# Patient Record
Sex: FEMALE | Race: BLACK OR AFRICAN AMERICAN | Employment: OTHER | URBAN - METROPOLITAN AREA
[De-identification: names, ages, dates, MRNs, and addresses within clinical notes are randomized per-mention and may not be internally consistent; named-entity substitution may affect disease eponyms.]

---

## 2017-01-30 ENCOUNTER — HOSPITAL ENCOUNTER (OUTPATIENT)
Dept: GENERAL RADIOLOGY | Age: 82
Discharge: HOME | End: 2017-01-30
Payer: MEDICARE

## 2017-01-30 DIAGNOSIS — R41.0 DISORIENTATION: ICD-10-CM

## 2017-01-30 PROCEDURE — 71020 XR CHEST PA LAT: CPT

## 2017-02-15 ENCOUNTER — HOSPITAL ENCOUNTER (OUTPATIENT)
Dept: CT IMAGING | Age: 82
Discharge: HOME | End: 2017-02-15
Attending: INTERNAL MEDICINE
Payer: MEDICARE

## 2017-02-15 DIAGNOSIS — R41.0 DISORIENTATION: ICD-10-CM

## 2017-02-15 PROCEDURE — 70450 CT HEAD/BRAIN W/O DYE: CPT

## 2017-03-01 ENCOUNTER — HOSPITAL ENCOUNTER (INPATIENT)
Age: 82
LOS: 5 days | Discharge: NURSING, REHAB OR LONG-TERM CARE | DRG: 640 | End: 2017-03-06
Attending: EMERGENCY MEDICINE | Admitting: HOSPITALIST
Payer: MEDICARE

## 2017-03-01 ENCOUNTER — APPOINTMENT (OUTPATIENT)
Dept: GENERAL RADIOLOGY | Age: 82
DRG: 640 | End: 2017-03-01
Attending: EMERGENCY MEDICINE
Payer: MEDICARE

## 2017-03-01 DIAGNOSIS — R53.81 DEBILITY: ICD-10-CM

## 2017-03-01 DIAGNOSIS — F41.9 ANXIETY: ICD-10-CM

## 2017-03-01 DIAGNOSIS — R53.1 WEAKNESS: Primary | ICD-10-CM

## 2017-03-01 DIAGNOSIS — F22 PARANOIA (HCC): ICD-10-CM

## 2017-03-01 DIAGNOSIS — Z71.89 ADVANCED CARE PLANNING/COUNSELING DISCUSSION: ICD-10-CM

## 2017-03-01 DIAGNOSIS — R53.1 GENERALIZED WEAKNESS: ICD-10-CM

## 2017-03-01 LAB
ALBUMIN SERPL BCP-MCNC: 3.8 G/DL (ref 3.5–5)
ALBUMIN/GLOB SERPL: 0.9 {RATIO} (ref 1.1–2.2)
ALP SERPL-CCNC: 84 U/L (ref 45–117)
ALT SERPL-CCNC: 33 U/L (ref 12–78)
ANION GAP BLD CALC-SCNC: 13 MMOL/L (ref 5–15)
APPEARANCE UR: CLEAR
AST SERPL W P-5'-P-CCNC: 28 U/L (ref 15–37)
BACTERIA URNS QL MICRO: NEGATIVE /HPF
BASOPHILS # BLD AUTO: 0 K/UL (ref 0–0.1)
BASOPHILS # BLD: 0 % (ref 0–1)
BILIRUB SERPL-MCNC: 0.8 MG/DL (ref 0.2–1)
BILIRUB UR QL: NEGATIVE
BUN SERPL-MCNC: 20 MG/DL (ref 6–20)
BUN/CREAT SERPL: 15 (ref 12–20)
CALCIUM SERPL-MCNC: 10.7 MG/DL (ref 8.5–10.1)
CHLORIDE SERPL-SCNC: 93 MMOL/L (ref 97–108)
CK SERPL-CCNC: 66 U/L (ref 26–192)
CO2 SERPL-SCNC: 25 MMOL/L (ref 21–32)
COLOR UR: ABNORMAL
CREAT SERPL-MCNC: 1.37 MG/DL (ref 0.55–1.02)
EOSINOPHIL # BLD: 0 K/UL (ref 0–0.4)
EOSINOPHIL NFR BLD: 0 % (ref 0–7)
EPITH CASTS URNS QL MICRO: ABNORMAL /LPF
ERYTHROCYTE [DISTWIDTH] IN BLOOD BY AUTOMATED COUNT: 14 % (ref 11.5–14.5)
GLOBULIN SER CALC-MCNC: 4.4 G/DL (ref 2–4)
GLUCOSE BLD STRIP.AUTO-MCNC: 74 MG/DL (ref 65–100)
GLUCOSE BLD STRIP.AUTO-MCNC: 96 MG/DL (ref 65–100)
GLUCOSE SERPL-MCNC: 82 MG/DL (ref 65–100)
GLUCOSE UR STRIP.AUTO-MCNC: NEGATIVE MG/DL
HCT VFR BLD AUTO: 36.7 % (ref 35–47)
HGB BLD-MCNC: 12.7 G/DL (ref 11.5–16)
HGB UR QL STRIP: NEGATIVE
HYALINE CASTS URNS QL MICRO: ABNORMAL /LPF (ref 0–5)
KETONES UR QL STRIP.AUTO: 40 MG/DL
LEUKOCYTE ESTERASE UR QL STRIP.AUTO: NEGATIVE
LYMPHOCYTES # BLD AUTO: 12 % (ref 12–49)
LYMPHOCYTES # BLD: 1.3 K/UL (ref 0.8–3.5)
MCH RBC QN AUTO: 30.5 PG (ref 26–34)
MCHC RBC AUTO-ENTMCNC: 34.6 G/DL (ref 30–36.5)
MCV RBC AUTO: 88.2 FL (ref 80–99)
MONOCYTES # BLD: 0.9 K/UL (ref 0–1)
MONOCYTES NFR BLD AUTO: 8 % (ref 5–13)
NEUTS SEG # BLD: 8.2 K/UL (ref 1.8–8)
NEUTS SEG NFR BLD AUTO: 80 % (ref 32–75)
NITRITE UR QL STRIP.AUTO: NEGATIVE
PH UR STRIP: 5.5 [PH] (ref 5–8)
PLATELET # BLD AUTO: 247 K/UL (ref 150–400)
POTASSIUM SERPL-SCNC: 4.1 MMOL/L (ref 3.5–5.1)
PROT SERPL-MCNC: 8.2 G/DL (ref 6.4–8.2)
PROT UR STRIP-MCNC: ABNORMAL MG/DL
RBC # BLD AUTO: 4.16 M/UL (ref 3.8–5.2)
RBC #/AREA URNS HPF: ABNORMAL /HPF (ref 0–5)
SERVICE CMNT-IMP: NORMAL
SERVICE CMNT-IMP: NORMAL
SODIUM SERPL-SCNC: 131 MMOL/L (ref 136–145)
SP GR UR REFRACTOMETRY: 1.01 (ref 1–1.03)
UROBILINOGEN UR QL STRIP.AUTO: 1 EU/DL (ref 0.2–1)
WBC # BLD AUTO: 10.4 K/UL (ref 3.6–11)
WBC URNS QL MICRO: ABNORMAL /HPF (ref 0–4)

## 2017-03-01 PROCEDURE — 83036 HEMOGLOBIN GLYCOSYLATED A1C: CPT | Performed by: INTERNAL MEDICINE

## 2017-03-01 PROCEDURE — 85025 COMPLETE CBC W/AUTO DIFF WBC: CPT | Performed by: STUDENT IN AN ORGANIZED HEALTH CARE EDUCATION/TRAINING PROGRAM

## 2017-03-01 PROCEDURE — 71020 XR CHEST PA LAT: CPT

## 2017-03-01 PROCEDURE — 82550 ASSAY OF CK (CPK): CPT | Performed by: EMERGENCY MEDICINE

## 2017-03-01 PROCEDURE — 82962 GLUCOSE BLOOD TEST: CPT

## 2017-03-01 PROCEDURE — 81001 URINALYSIS AUTO W/SCOPE: CPT | Performed by: STUDENT IN AN ORGANIZED HEALTH CARE EDUCATION/TRAINING PROGRAM

## 2017-03-01 PROCEDURE — 72052 X-RAY EXAM NECK SPINE 6/>VWS: CPT

## 2017-03-01 PROCEDURE — 51701 INSERT BLADDER CATHETER: CPT

## 2017-03-01 PROCEDURE — 77030011943

## 2017-03-01 PROCEDURE — 36415 COLL VENOUS BLD VENIPUNCTURE: CPT | Performed by: STUDENT IN AN ORGANIZED HEALTH CARE EDUCATION/TRAINING PROGRAM

## 2017-03-01 PROCEDURE — 93005 ELECTROCARDIOGRAM TRACING: CPT

## 2017-03-01 PROCEDURE — 74011250636 HC RX REV CODE- 250/636: Performed by: HOSPITALIST

## 2017-03-01 PROCEDURE — 65270000032 HC RM SEMIPRIVATE

## 2017-03-01 PROCEDURE — 80053 COMPREHEN METABOLIC PANEL: CPT | Performed by: STUDENT IN AN ORGANIZED HEALTH CARE EDUCATION/TRAINING PROGRAM

## 2017-03-01 PROCEDURE — 99285 EMERGENCY DEPT VISIT HI MDM: CPT

## 2017-03-01 RX ORDER — ESTRADIOL 10 UG/1
10 INSERT VAGINAL
COMMUNITY

## 2017-03-01 RX ORDER — COLCHICINE 0.6 MG/1
0.3 TABLET ORAL DAILY
Status: DISCONTINUED | OUTPATIENT
Start: 2017-03-02 | End: 2017-03-06 | Stop reason: HOSPADM

## 2017-03-01 RX ORDER — LOSARTAN POTASSIUM 50 MG/1
50 TABLET ORAL DAILY
Status: DISCONTINUED | OUTPATIENT
Start: 2017-03-02 | End: 2017-03-02

## 2017-03-01 RX ORDER — LATANOPROST 50 UG/ML
1 SOLUTION/ DROPS OPHTHALMIC
COMMUNITY

## 2017-03-01 RX ORDER — LOSARTAN POTASSIUM 50 MG/1
50 TABLET ORAL DAILY
COMMUNITY

## 2017-03-01 RX ORDER — ACETAMINOPHEN 325 MG/1
650 TABLET ORAL
Status: DISCONTINUED | OUTPATIENT
Start: 2017-03-01 | End: 2017-03-06 | Stop reason: HOSPADM

## 2017-03-01 RX ORDER — CALCITRIOL 0.25 UG/1
0.25 CAPSULE ORAL
Status: DISCONTINUED | OUTPATIENT
Start: 2017-03-03 | End: 2017-03-06 | Stop reason: HOSPADM

## 2017-03-01 RX ORDER — SODIUM CHLORIDE 0.9 % (FLUSH) 0.9 %
5-10 SYRINGE (ML) INJECTION AS NEEDED
Status: DISCONTINUED | OUTPATIENT
Start: 2017-03-01 | End: 2017-03-06 | Stop reason: HOSPADM

## 2017-03-01 RX ORDER — ENOXAPARIN SODIUM 100 MG/ML
30 INJECTION SUBCUTANEOUS EVERY 24 HOURS
Status: DISCONTINUED | OUTPATIENT
Start: 2017-03-01 | End: 2017-03-06 | Stop reason: HOSPADM

## 2017-03-01 RX ORDER — SODIUM CHLORIDE 0.9 % (FLUSH) 0.9 %
5-10 SYRINGE (ML) INJECTION EVERY 8 HOURS
Status: DISCONTINUED | OUTPATIENT
Start: 2017-03-01 | End: 2017-03-06 | Stop reason: HOSPADM

## 2017-03-01 RX ORDER — SODIUM CHLORIDE 9 MG/ML
100 INJECTION, SOLUTION INTRAVENOUS CONTINUOUS
Status: DISCONTINUED | OUTPATIENT
Start: 2017-03-01 | End: 2017-03-02

## 2017-03-01 RX ORDER — LATANOPROST 50 UG/ML
1 SOLUTION/ DROPS OPHTHALMIC
Status: DISCONTINUED | OUTPATIENT
Start: 2017-03-01 | End: 2017-03-06 | Stop reason: HOSPADM

## 2017-03-01 RX ORDER — COLCHICINE 0.6 MG/1
0.6 TABLET ORAL DAILY
COMMUNITY

## 2017-03-01 RX ORDER — CALCITRIOL 0.25 UG/1
0.25 CAPSULE ORAL
COMMUNITY

## 2017-03-01 RX ORDER — ONDANSETRON 2 MG/ML
4 INJECTION INTRAMUSCULAR; INTRAVENOUS
Status: DISCONTINUED | OUTPATIENT
Start: 2017-03-01 | End: 2017-03-06 | Stop reason: HOSPADM

## 2017-03-01 RX ADMIN — Medication 10 ML: at 21:37

## 2017-03-01 RX ADMIN — ENOXAPARIN SODIUM 30 MG: 30 INJECTION SUBCUTANEOUS at 18:34

## 2017-03-01 RX ADMIN — Medication 10 ML: at 18:35

## 2017-03-01 RX ADMIN — SODIUM CHLORIDE 100 ML/HR: 900 INJECTION, SOLUTION INTRAVENOUS at 18:35

## 2017-03-01 NOTE — PROGRESS NOTES
SSED/CM consult received and appreciated. EMR reviewed. Case discussed w/ CarineRN shared concern about patient's caregiver hospitalized at [facility]. CM received call from [facility] and informed patient's twin sister/caregiver Sonam Barney is hospitalized at [facility] - provided CM name/number of friend of twin sisters Mady Pandya [phone] (H)/ [phone] (M). Friend currently visiting and will see patient in the ER. CM arrived to patient's room as was leaving unit for test. Introduced self to patient and Marisol West. MsDianneZinaElizabeth provided brief history for twin sisters. Marisol West has known patient for 20 years and are members of [address] [place] [address]. Emile Schlatter loss her spouse 4 years ago and lives alone in [place] and her twin sister lives in [facility]. Confusion noted in patient 5 weeks ago of which Star Schaffer had drove her vehicle until then at that time sister started driving patient to MD appointments and bring meals. Patient does not have any children - other relative know is Stefani Llanos ([facility] who is Marielle's daughter. Miguel Angel Batista had a stroke a year ago - Marisol West has attempted to reach niece. When friend had noticed changes in patient a few weeks ago - expressed to sister the need to accompany her and Star Schaffer to MD appointments in case something happened to Betty she would know which physicians were following and locations - however current hospitalization occurred before planning could take place. Patient said recently \" someone is trying to poison me and not wanting to die in house alone. \"    Marisol West lives in [facility] and was concerned when she did not see twin sister at [facility] on Sunday 2/26/17 since Betty had become care giver for Star Schaffer. This writer asked friend if she would be able to become caregiver for patient   - Ms. Landis declines accepting caregiver role.       Care Management Interventions  Mode of Transport at Discharge: [address] [facility]  [address] Time of Discharge: 1252  Transition of Care Consult (CM Consult): Discharge Planning  MyChart Signup: No  Discharge Durable Medical Equipment: No  Physical Therapy Consult: No  Occupational Therapy Consult: No  Speech Therapy Consult: No  Current Support Network: Lives Alone  Confirm Follow Up Transport:  (TBD)  Plan discussed with Pt/Family/Caregiver: No  Discharge Location  Discharge Placement:  (TBD)      5342 Attempted to reach [facility] is full.

## 2017-03-01 NOTE — ED TRIAGE NOTES
Patient arrives via EMS from home with c/o not taking her medications or eating in the past 2-3 days. Patient has hx dementia is lives with her sister who is her primary caregiver. Patients sister was admitted into the hospital a couple days ago and patient was left home alone. Patients neighbor noticed they hadn't saw the patient or her sister in the past couple days so she called PD. Patient appeared disheveled and appeared to not have taken any medications or ate in a couple days. EMS BS 72. Patient denies any complaints at this time.

## 2017-03-01 NOTE — H&P
[address]   [address], [address]   HISTORY AND PHYSICAL       Name:  Harman Sun   MR#:  [id number]   :  1929   Account #:  [de-identified]        Date of Adm:  2017       ADMITTING ATTENDING: Greer Nicole MD    PRIMARY CARE PHYSICIAN: Please note, although the system says,   Dr. Maria Del Rosario Rahman, the patient claimed that her primary care physician   is Dr. Daryl Eagle. CHIEF COMPLAINT: None. HISTORY OF PRESENT ILLNESS: Please note that this whole history   is as per the ER physician as well as the ER nurse and the EMS note,   but I was not able to get a good history from the patient. This is an 80  year-old [facility] American female who has a past medical history of   dementia and hypertension, hypercalcemia. She comes over here   because she lives alone and usually her twin sister comes and takes   care of her every day and fixes meals for her as well and gives her her   own medications, but when the neighbor did not see the sister as well   as the patient for the last 3 days, then she called the EMS. It was   noted that the patient has not been eating since the last 3 days and   there were no signs of injury, although the patient initially claimed that   she has pain all over the body, but when I asked her, she did not have   any complaints whatsoever, except for not eaten anything since the   last 3 days. No chest pain, shortness of breath, headache, dizziness,   cough, fever, changes in bowel movements. No nausea or vomiting. REVIEW OF SYSTEMS: Ten review of systems were done; they were   all negative except for above. PAST MEDICAL HISTORY: I am not sure if this is complete, but the   patient has:   1. Dementia. 2. Hypercalcemia. 3. Hypertension. 4. History of gout. PAST SURGICAL HISTORY: I am not able to gather the patient's past   surgical history from the patient. SOCIAL HISTORY: Nonsmoker, nonalcoholic, nondrug abuser.     ALLERGIES: THE PATIENT IS ALLERGIC TO PENICILLIN. HOME MEDICATIONS   1. Calcitriol 0.25 mcg p.o. daily. 2. Colchicine 0.6 mg p.o. daily. 3. Losartan 50 mg p.o. daily. FAMILY HISTORY: Not able to get family history from the patient. PHYSICAL EXAMINATION   VITAL SIGNS: At the time that the patient was seen, the patient's vitals   were as follows: Blood pressure 179/74, pulse 77, respiratory rate 17,   saturating 99% on room air. GENERAL: Not in distress, comfortably lying in bed. HEENT: Head normocephalic, atraumatic. EYES: PERRLA, EOMI. EARS: Bilateral hearing normal. No growths. NOSE: No polyps, no bleeding. MOUTH: Dry mucous membranes. Decent oral hygiene. NECK: Supple, no JVD, no thyromegaly. RESPIRATORY: Clear to auscultation bilaterally. No adventitious   breath sounds. CARDIOVASCULAR: S1, S2 normal. No murmurs, rubs or gallops. GASTROINTESTINAL: Bowel sounds present. Soft, nontender,   nondistended. NEUROLOGICAL: Cranial nerves II through XII intact. Motor    bilateral upper limbs and lower limbs. Sensory normal.   PSYCHIATRIC: Mood and affect appropriate. Alert, awake, oriented   x1. LABORATORIES AND RADIOLOGICAL RESULTS: WBC 10.4,   hemoglobin 12.7, hematocrit 36 and a platelet count of 815. A   urinalysis did not show any signs of infection. Sodium 131, potassium   4.1, chloride 93, bicarbonate 25, glucose 82, BUN 20, creatinine 1.37. The patient's baseline creatinine seems to be close 1.1 to 1.2. CT scan   of the head was done recently which showed multifocal white matter   disease, no acute abnormality. X-ray of the chest was done today. It   was essentially normal. X-ray of the cervical spine showed no acute   abnormality, diffuse degenerative changes. ELECTROCARDIOGRAM: Shows sinus rhythm with frequent PVCs,   no ST-T wave changes suggestive of ischemia.     ASSESSMENT AND PLAN: This is an 71-year-old [facility] American   female with past medical history of hypertension, hypercalcemia,   dementia and gout, who comes over here because she has not been   able to take care of herself. 1. Failure to thrive. We will admit the patient and currently the patient   looks quite dehydrated. I would put the patient on IV fluids and would   also involve Physical Therapy and Case Management in taking care of   the patient. 2. Hypertension. We would restart her home medications and we will   monitor it closely. 3. Hypercalcemia. Currently, I am not sure what is the cause of the   patient's hypercalcemia. Probably tomorrow we would have to reach   the patient's primary care physician to find out the cause of it. We   would continue the patient's calcitriol for now. 4. For now, I will keep the patient FULL CODE and will consult   Palliative Care and finding out the patient's code status.         MD Colin Ya / Anni Miranda   D:  03/01/2017   16:33   T:  03/01/2017   17:14   Job #:  [id number]

## 2017-03-01 NOTE — ROUTINE PROCESS
TRANSFER - OUT REPORT:    Verbal report given to Mirna TEJADA(name) on Nadege Dukes  being transferred to [address] [place] [address](unit) for routine progression of care       Report consisted of patients Situation, Background, Assessment and   Recommendations(SBAR). Information from the following report(s) SBAR, ED Summary, Procedure Summary, MAR, Recent Results and Cardiac Rhythm NSR was reviewed with the receiving nurse. Lines:   Peripheral IV 03/01/17 Left Antecubital (Active)   Site Assessment Clean, dry, & intact 3/1/2017  1:20 PM   Phlebitis Assessment 0 3/1/2017  1:20 PM   Infiltration Assessment 0 3/1/2017  1:20 PM   Dressing Status Clean, dry, & intact 3/1/2017  1:20 PM   Dressing Type Transparent 3/1/2017  1:20 PM   Hub Color/Line Status Blue 3/1/2017  1:20 PM   Action Taken Blood drawn 3/1/2017  1:20 PM        Opportunity for questions and clarification was provided.       Patient transported with:   transport

## 2017-03-01 NOTE — ED PROVIDER NOTES
HPI Comments: 80 y.o. female withmedical history significant for dementia who presents from home for evaluation of health. Pt arrives via EMS after a neighbor became worried because she didn't see the pt's sister for several days who is her primary caretaker. The pt's sister was admitted to the hospital several days ago, and per EMS it appears that the pt hasn't been eating or taking her medications since her sister was admitted to the hospital. Pt states that she lives alone, and that her sister generally takes care of her. Pt claims that she walked with assistance to the ambulance when they came to get her. Pt claims she has pain all over, and specifically mentions her neck. Per EMS pt's blood glucose was 72. Pt is unsure if she's fallen. There are no other acute medical concerns at this time. PCP: Debbie Dukes MD    Note written by Yanira Maurice, as dictated by Ricky San MD 2:05 PM    Full history, physical exam, and ROS unable to be obtained due to:  dementia. The history is provided by the patient. The history is limited by the condition of the patient. No  was used. No past medical history on file. No past surgical history on file. No family history on file. Social History     Social History    Marital status:      Spouse name: N/A    Number of children: N/A    Years of education: N/A     Occupational History    Not on file.      Social History Main Topics    Smoking status: Not on file    Smokeless tobacco: Not on file    Alcohol use Not on file    Drug use: Not on file    Sexual activity: Not on file     Other Topics Concern    Not on file     Social History Narrative         ALLERGIES: Penicillins    Review of Systems   Unable to perform ROS: Dementia       Vitals:    03/01/17 1307   BP: 162/72   Pulse: 88   Resp: 16   Temp: 98.1 °F (36.7 °C)   SpO2: 97%   Weight: 47.6 kg (105 lb)   Height: 5' (1.524 m)            Physical Exam   Constitutional: She is oriented to person, place, and time. She appears well-developed and well-nourished. HENT:   Head: Normocephalic and atraumatic. Mouth/Throat: Oropharynx is clear and moist.   Eyes: EOM are normal. Pupils are equal, round, and reactive to light. Neck: Normal range of motion. Neck supple. Cardiovascular: Normal rate, regular rhythm, normal heart sounds and intact distal pulses. Exam reveals no gallop and no friction rub. No murmur heard. Pulmonary/Chest: Effort normal. No respiratory distress. She has no wheezes. She has no rales. Abdominal: Soft. There is no tenderness. There is no rebound. Musculoskeletal: Normal range of motion. She exhibits no tenderness. Can lfit both LE 1 inch above stretcher, Can lift both arms against gravity. Neurological: She is alert and oriented to person, place, and time. No cranial nerve deficit. Motor; symmetric   Skin: No erythema. Psychiatric: Her behavior is normal. Her mood appears anxious. She exhibits a depressed mood. Nursing note and vitals reviewed. Note written by Yanira Nelson, as dictated by Atif Flores MD 2:07 PM      [facility]  ED Course       Procedures    CONSULT NOTE:  3:20 PM Atif Flores MD spoke with Dr. Stevie Mckay, Consult for Hospitalist.  Discussed available diagnostic tests and clinical findings. He is in agreement with care plans as outlined. Dr. Stevie Mckay will admit.

## 2017-03-01 NOTE — PROGRESS NOTES
TRANSFER - IN REPORT:    Verbal report received from [facility]) on Lalo Bertrand  being received from ED(unit) for routine progression of care      Report consisted of patients Situation, Background, Assessment and   Recommendations(SBAR). Information from the following report(s) SBAR, ED Summary, MAR, Recent Results and Med Rec Status was reviewed with the receiving nurse. Opportunity for questions and clarification was provided. Assessment completed upon patients arrival to unit and care assumed.

## 2017-03-01 NOTE — ED NOTES
Bedside and Verbal shift change report given to Skipper Paget, RN (oncoming nurse) by Lindle Sandifer, RN (offgoing nurse). Report given with SBAR, Kardex, Intake/Output, MAR and Recent Results.

## 2017-03-01 NOTE — IP AVS SNAPSHOT
[address] [place] [address] 
[phone] Patient: Darius Walsh MRN: [id number] :1929 You are allergic to the following Allergen Reactions Penicillins Unknown (comments) Hx dementia Recent Documentation Height Weight Breastfeeding? BMI  
  
 1.524 m 47.6 kg No 20.51 kg/m2 Emergency Contacts  (Rel.) Home Phone Work Phone Mobile Phone [address] (Other Relative) [phone] -- [phone] About your hospitalization You were admitted on:  2017 You last received care in the:  [facility] You were discharged on:  2017 Why you were hospitalized Your primary diagnosis was:  Not on File Your diagnoses also included:  Generalized Weakness, Debility, Paranoia (Hcc), Anxiety, Advanced Care Planning/Counseling Discussion Providers Seen During Your Hospitalizations Provider Role Specialty Primary office phone Felipe Henriquez MD Attending Provider Emergency Medicine [phone] Nino Beebe MD Attending Provider Internal Medicine [phone] Rolf Machuca MD Attending Provider Internal Medicine [phone] Cathi Tavarez MD Attending Provider Internal Medicine [phone] Jacqueline Zapien MD Attending Provider Internal Medicine [phone] Your Primary Care Physician (PCP) Primary Care Physician Office Phone Office Fax Dario Bolivar [phone] Follow-up Information Follow up With Details Comments Contact Info Sahra Sweet MD In 2 weeks UTI, in hospital, f/u UA, other lab as felt needed. [address] [place] [address] Cardiology [address] [place] [address] 
[phone] Current Discharge Medication List  
  
START taking these medications Dose & Instructions Dispensing Information Comments Morning Noon Evening Bedtime cefUROXime 250 mg tablet Commonly known as:  CEFTIN Your next dose is: Today, Tomorrow Other:  _________ Dose:  250 mg Take 1 Tab by mouth two (2) times a day. Quantity:  4 Tab Refills:  0 CONTINUE these medications which have NOT CHANGED Dose & Instructions Dispensing Information Comments Morning Noon Evening Bedtime  
 calcitRIOL 0.25 mcg capsule Commonly known as:  ROCALTROL Your next dose is: Today, Tomorrow Other:  _________ Dose:  0.25 mcg Take 0.25 mcg by mouth. Take 1 capsule by mouth every Tuesday, Wednesday, Friday, Saturday, Sunday Refills:  0  
     
   
   
   
  
 colchicine 0.6 mg tablet Your next dose is: Today, Tomorrow Other:  _________ Dose:  0.6 mg Take 0.6 mg by mouth daily. Refills:  0  
     
   
   
   
  
 latanoprost 0.005 % ophthalmic solution Commonly known as:  Pro Jumbo Your next dose is: Today, Tomorrow Other:  _________ Dose:  1 Drop Administer 1 Drop to both eyes nightly. Refills:  0  
     
   
   
   
  
 losartan 50 mg tablet Commonly known as:  COZAAR Your next dose is: Today, Tomorrow Other:  _________ Dose:  50 mg Take 50 mg by mouth daily. Refills:  0 YUVAFEM 10 mcg Tab vaginal tablet Generic drug:  estradiol Your next dose is: Today, Tomorrow Other:  _________ Dose:  10 mcg Insert 10 mcg into vagina nightly. Refills:  0 Where to Get Your Medications Information on where to get these meds will be given to you by the nurse or doctor. ! Ask your nurse or doctor about these medications  
  cefUROXime 250 mg tablet Discharge Instructions Discharge Instructions PATIENT ID: Dustin Rios MRN: [id number] YOB: 1929 DATE OF ADMISSION: 3/1/2017 12:53 PM   
DATE OF DISCHARGE: 3/6/2017 PRIMARY CARE PROVIDER: Michaelle Bauer MD  
 
ATTENDING PHYSICIAN: Naila Asif MD 
DISCHARGING PROVIDER: Naila Asif MD   
To contact this individual call [phone] and ask the  to page. If unavailable ask to be transferred the Adult Hospitalist Department. DISCHARGE DIAGNOSES see dc note CONSULTATIONS: ED CONSULT TO SENIOR SERVICES CASE MANAGEMENT 
IP CONSULT TO PALLIATIVE CARE - PROVIDER 
IP CONSULT TO HOSPITALIST 
 
PROCEDURES/SURGERIES: * No surgery found * PENDING TEST RESULTS:  
At the time of discharge the following test results are still pending: na 
 
FOLLOW UP APPOINTMENTS:  
Follow-up Information Follow up With Details Comments Contact Info Michaelle Bauer MD In 2 weeks UTI, in hospital, f/u UA, other lab as felt needed. [address] [place] [address] Cardiology [address] [place] [address] 
[phone] ADDITIONAL CARE RECOMMENDATIONS: na 
 
DIET: regular low lactose diet Oral Nutritional Supplements: Ensure Complete or Ensure Plus Three times daily ACTIVITY: Activity as tolerated WOUND CARE: na 
  
EQUIPMENT needed: na 
 
 
 
 
DISPOSITION: 
  Home With: 
 OT  PT  Troy Peres RN  
  
 x SNF/Inpatient Rehab/LTAC Independent/assisted living Hospice Other: CDMP Checked:  
  
 
Signed:  
Deon Cheng MD 
3/6/2017 
2:59 PM 
 
 
Discharge Orders None Tavern Announcement We are excited to announce that we are making your provider's discharge notes available to you in Tavern. You will see these notes when they are completed and signed by the physician that discharged you from your recent hospital stay. If you have any questions or concerns about any information you see in Tavern, please call the Health Information Department where you were seen or reach out to your Primary Care Provider for more information about your plan of care. Introducing hospitals & HEALTH SERVICES! Dinah Garcia introduces Tavern patient portal. Now you can access parts of your medical record, email your doctor's office, and request medication refills online. 1. In your internet browser, go to [email or web address] Mobile 2. Click on the First Time User? Click Here link in the Sign In box. You will see the New Member Sign Up page. 3. Enter your Tavern Access Code exactly as it appears below. You will not need to use this code after youve completed the sign-up process. If you do not sign up before the expiration date, you must request a new code. · Tavern Access Code: [email or web address] Expires: 4/30/2017  1:24 PM 
 
4. Enter the last four digits of your Social Security Number (xxxx) and Date of Birth (mm/dd/yyyy) as indicated and click Submit. You will be taken to the next sign-up page. 5. Create a eflowt ID. This will be your Tavern login ID and cannot be changed, so think of one that is secure and easy to remember. 6. Create a Tavern password. You can change your password at any time. 7. Enter your Password Reset Question and Answer. This can be used at a later time if you forget your password. 8. Enter your e-mail address.  You will receive e-mail notification when new information is available in IPLogict. 9. Click Sign Up. You can now view and download portions of your medical record. 10. Click the Download Summary menu link to download a portable copy of your medical information. If you have questions, please visit the Frequently Asked Questions section of the [employer] website. Remember, [facility] is NOT to be used for urgent needs. For medical emergencies, dial 911. Now available from your iPhone and Android! General Information Please provide this summary of care documentation to your next provider. Patient Signature:  ____________________________________________________________ Date:  ____________________________________________________________  
  
Ainsley Norris Provider Signature:  ____________________________________________________________ Date:  ____________________________________________________________

## 2017-03-01 NOTE — PROGRESS NOTES
Admission Medication Reconciliation:    Information obtained from: Rx Query / Patient    Chief Complaint for this Admission: Not taking her medications/ eating food    Allergies:  Penicillins    Comments/Recommendations: Discussed PTA medications with patient. 1) Added Calcitriol, Colchine, Yuvafem, Latanoprost, and Losartan  2) Patient does not seem to be a good historian and is unsure about why she is taking her medications. She also complained that she does not know if she is on the right regimen for her condition. 3) [facility] confirmed all active medications along with dosing    Prior to Admission Medications:   Prior to Admission Medications   Prescriptions Last Dose Informant Patient Reported? Taking?   calcitRIOL (ROCALTROL) 0.25 mcg capsule 2/28/2017  Yes Yes   Sig: Take 0.25 mcg by mouth. Take 1 capsule by mouth every Tuesday, Wednesday, Friday, Saturday, Sunday   colchicine 0.6 mg tablet 2/28/2017  Yes Yes   Sig: Take 0.6 mg by mouth daily. estradiol (YUVAFEM) 10 mcg tab vaginal tablet 2/28/2017  Yes Yes   Sig: Insert 10 mcg into vagina nightly. latanoprost (XALATAN) 0.005 % ophthalmic solution 2/28/2017  Yes Yes   Sig: Administer 1 Drop to both eyes nightly. losartan (COZAAR) 50 mg tablet 2/28/2017  Yes Yes   Sig: Take 50 mg by mouth daily.       Facility-Administered Medications: None     Dexter Villeda  PharmD Candidate 2017  KERRI Farrell

## 2017-03-02 PROBLEM — F41.9 ANXIETY: Status: ACTIVE | Noted: 2017-03-02

## 2017-03-02 PROBLEM — F22 PARANOIA (HCC): Status: ACTIVE | Noted: 2017-03-02

## 2017-03-02 PROBLEM — Z71.89 ADVANCED CARE PLANNING/COUNSELING DISCUSSION: Status: ACTIVE | Noted: 2017-03-02

## 2017-03-02 PROBLEM — R53.81 DEBILITY: Status: ACTIVE | Noted: 2017-03-02

## 2017-03-02 LAB
ALBUMIN SERPL BCP-MCNC: 3 G/DL (ref 3.5–5)
ALBUMIN/GLOB SERPL: 0.8 {RATIO} (ref 1.1–2.2)
ALP SERPL-CCNC: 75 U/L (ref 45–117)
ALT SERPL-CCNC: 26 U/L (ref 12–78)
ANION GAP BLD CALC-SCNC: 10 MMOL/L (ref 5–15)
AST SERPL W P-5'-P-CCNC: 22 U/L (ref 15–37)
ATRIAL RATE: 96 BPM
BILIRUB SERPL-MCNC: 0.8 MG/DL (ref 0.2–1)
BUN SERPL-MCNC: 16 MG/DL (ref 6–20)
BUN/CREAT SERPL: 14 (ref 12–20)
CALCIUM SERPL-MCNC: 9.4 MG/DL (ref 8.5–10.1)
CALCULATED P AXIS, ECG09: 72 DEGREES
CALCULATED R AXIS, ECG10: 63 DEGREES
CALCULATED T AXIS, ECG11: 56 DEGREES
CHLORIDE SERPL-SCNC: 97 MMOL/L (ref 97–108)
CO2 SERPL-SCNC: 26 MMOL/L (ref 21–32)
CREAT SERPL-MCNC: 1.16 MG/DL (ref 0.55–1.02)
DIAGNOSIS, 93000: NORMAL
EST. AVERAGE GLUCOSE BLD GHB EST-MCNC: 103 MG/DL
FOLATE SERPL-MCNC: 14.4 NG/ML (ref 5–21)
GLOBULIN SER CALC-MCNC: 3.6 G/DL (ref 2–4)
GLUCOSE SERPL-MCNC: 132 MG/DL (ref 65–100)
HBA1C MFR BLD: 5.2 % (ref 4.2–6.3)
IRON SATN MFR SERPL: 32 % (ref 20–50)
IRON SERPL-MCNC: 55 UG/DL (ref 35–150)
P-R INTERVAL, ECG05: 140 MS
POTASSIUM SERPL-SCNC: 3.9 MMOL/L (ref 3.5–5.1)
PROT SERPL-MCNC: 6.6 G/DL (ref 6.4–8.2)
Q-T INTERVAL, ECG07: 312 MS
QRS DURATION, ECG06: 62 MS
QTC CALCULATION (BEZET), ECG08: 394 MS
SODIUM SERPL-SCNC: 133 MMOL/L (ref 136–145)
TIBC SERPL-MCNC: 174 UG/DL (ref 250–450)
TSH SERPL DL<=0.05 MIU/L-ACNC: 0.26 UIU/ML (ref 0.36–3.74)
VENTRICULAR RATE, ECG03: 96 BPM
VIT B12 SERPL-MCNC: 1350 PG/ML (ref 211–911)

## 2017-03-02 PROCEDURE — 80053 COMPREHEN METABOLIC PANEL: CPT | Performed by: HOSPITALIST

## 2017-03-02 PROCEDURE — 74011250636 HC RX REV CODE- 250/636: Performed by: INTERNAL MEDICINE

## 2017-03-02 PROCEDURE — 74011250637 HC RX REV CODE- 250/637: Performed by: HOSPITALIST

## 2017-03-02 PROCEDURE — 76450000000

## 2017-03-02 PROCEDURE — 83540 ASSAY OF IRON: CPT | Performed by: INTERNAL MEDICINE

## 2017-03-02 PROCEDURE — 84443 ASSAY THYROID STIM HORMONE: CPT | Performed by: INTERNAL MEDICINE

## 2017-03-02 PROCEDURE — 65270000032 HC RM SEMIPRIVATE

## 2017-03-02 PROCEDURE — 82607 VITAMIN B-12: CPT | Performed by: INTERNAL MEDICINE

## 2017-03-02 PROCEDURE — 36415 COLL VENOUS BLD VENIPUNCTURE: CPT | Performed by: HOSPITALIST

## 2017-03-02 PROCEDURE — 82746 ASSAY OF FOLIC ACID SERUM: CPT | Performed by: INTERNAL MEDICINE

## 2017-03-02 PROCEDURE — 74011250636 HC RX REV CODE- 250/636: Performed by: HOSPITALIST

## 2017-03-02 PROCEDURE — 74011000250 HC RX REV CODE- 250: Performed by: HOSPITALIST

## 2017-03-02 PROCEDURE — L4396 STATIC OR DYNAMI AFO PRE CST: HCPCS

## 2017-03-02 PROCEDURE — 74011250637 HC RX REV CODE- 250/637: Performed by: INTERNAL MEDICINE

## 2017-03-02 RX ORDER — HYDRALAZINE HYDROCHLORIDE 20 MG/ML
10 INJECTION INTRAMUSCULAR; INTRAVENOUS ONCE
Status: COMPLETED | OUTPATIENT
Start: 2017-03-02 | End: 2017-03-02

## 2017-03-02 RX ORDER — LOSARTAN POTASSIUM 50 MG/1
100 TABLET ORAL DAILY
Status: DISCONTINUED | OUTPATIENT
Start: 2017-03-03 | End: 2017-03-06 | Stop reason: HOSPADM

## 2017-03-02 RX ORDER — LOSARTAN POTASSIUM 50 MG/1
50 TABLET ORAL ONCE
Status: COMPLETED | OUTPATIENT
Start: 2017-03-02 | End: 2017-03-02

## 2017-03-02 RX ORDER — SODIUM CHLORIDE 9 MG/ML
75 INJECTION, SOLUTION INTRAVENOUS CONTINUOUS
Status: DISCONTINUED | OUTPATIENT
Start: 2017-03-02 | End: 2017-03-05

## 2017-03-02 RX ADMIN — HYDRALAZINE HYDROCHLORIDE 10 MG: 20 INJECTION INTRAMUSCULAR; INTRAVENOUS at 15:15

## 2017-03-02 RX ADMIN — Medication 10 ML: at 22:12

## 2017-03-02 RX ADMIN — Medication 10 ML: at 14:00

## 2017-03-02 RX ADMIN — LOSARTAN POTASSIUM 50 MG: 50 TABLET ORAL at 11:45

## 2017-03-02 RX ADMIN — SODIUM CHLORIDE 100 ML/HR: 900 INJECTION, SOLUTION INTRAVENOUS at 03:58

## 2017-03-02 RX ADMIN — SODIUM CHLORIDE 75 ML/HR: 900 INJECTION, SOLUTION INTRAVENOUS at 18:23

## 2017-03-02 RX ADMIN — ENOXAPARIN SODIUM 30 MG: 30 INJECTION SUBCUTANEOUS at 17:48

## 2017-03-02 RX ADMIN — LATANOPROST 1 DROP: 50 SOLUTION OPHTHALMIC at 22:16

## 2017-03-02 RX ADMIN — COLCHICINE 0.3 MG: 0.6 TABLET, FILM COATED ORAL at 09:37

## 2017-03-02 RX ADMIN — LOSARTAN POTASSIUM 50 MG: 50 TABLET ORAL at 09:37

## 2017-03-02 NOTE — PROGRESS NOTES
Occupational therapy:  Patient's BP is still elevated this afternoon. Nurse Annie Carl is aware and asking we defer. The patient stated she was cold so I provided her with 2 more blankets and reassured her. She was anxious and did not want to get out of the bed. She may respond better to therapy staff if they are not wearing lab coats.   Ortiz Quintero MS, OTR/L

## 2017-03-02 NOTE — PROGRESS NOTES
Hospitalist Progress Note  Office: [phone]      Date of Service:  3/2/2017  NAME:  Emma Nair  :  1929  MRN:  [id number]      Admission Summary:   81 yo woman with dementia, hypercalcemia, and HTN was BIBEMS from home on 3/1/17 with FTT, not eating or taking meds for the last 2-3 days since her sister/caregiver was admitted to [facility]. Interval history / Subjective:    c/o pain all over; per nurse, BP has been high; patient's  at bedside     Assessment & Plan:     Failure to thrive/generalized weakness (POA)  - likely due to decreased po intake due to sister/caregiver's admission to hospital  - continue IVF  - PT/OT evals  - check TSH, vitamin D, prealbumin, B12  - may need Nutrition consult for supplements    Hypertension, uncontrolled (POA)  - likely due to not taking meds  - increase losartan 100mg po daily with hold parameters  - monitor while on IVF    Hypercalcemia (POA)  - possibly due to dehydration although cannot r/o occult malignancy  - resolved with IVF  - continue calcitriol for now    Hyponatremia (POA)  - likely due to decreased po fluid intake  - check TSH, cortisol  - improving with IVF    Code status: Full  DVT prophylaxis: enoxaparin    Care Plan discussed with: Patient/Family, Nurse and   Disposition: TBD. Came from home but will need SNF on discharge. Hospital Problems  Never Reviewed          Codes Class Noted POA    Generalized weakness ICD-10-CM: R53.1  ICD-9-CM: 780.79  3/1/2017 Unknown            Review of Systems:   Pertinent items are noted in HPI. Vital Signs:    Last 24hrs VS reviewed since prior progress note.  Most recent are:  Visit Vitals    BP (!) 209/93 (BP 1 Location: Left arm, BP Patient Position: At rest)    Pulse 80    Temp 98.9 °F (37.2 °C)    Resp 18    Ht 5' (1.524 m)    Wt 47.6 kg (105 lb)    SpO2 100%    Breastfeeding No    BMI 20.51 kg/m2 Intake/Output Summary (Last 24 hours) at 03/02/17 1138  Last data filed at 03/01/17 1757   Gross per 24 hour   Intake              480 ml   Output                0 ml   Net              480 ml      Physical Examination:     Constitutional:  awake, no acute distress, cooperative, pleasantly confused, rambling speech    ENT:  oral mucosa dry, oropharynx benign; neck supple   Resp:  CTA bilaterally, no wheezing/rhonchi/rales   CV:  regular rhythm, normal rate, no m/r/g appreciated, no edema, +pulses    GI:  +BS, soft, non distended, non tender     Musculoskeletal:  moves all extremities    Neurologic:   AAOx3, NFD     Skin:  warm, dry  Eyes:  PERRL    Data Review:    Review and/or order of clinical lab test  Review and/or order of tests in the radiology section of CPT  Review and/or order of tests in the medicine section of CPT    Labs:     Recent Labs      03/01/17   1323   WBC  10.4   HGB  12.7   HCT  36.7   PLT  247     Recent Labs      03/02/17   0149  03/01/17   1323   NA  133*  131*   K  3.9  4.1   CL  97  93*   CO2  26  25   BUN  16  20   CREA  1.16*  1.37*   GLU  132*  82   CA  9.4  10.7*     Recent Labs      03/02/17   0149  03/01/17   1323   SGOT  22  28   ALT  26  33   AP  75  84   TBILI  0.8  0.8   TP  6.6  8.2   ALB  3.0*  3.8   GLOB  3.6  4.4*     No results for input(s): INR, PTP, APTT in the last 72 hours. No lab exists for component: INREXT   No results for input(s): FE, TIBC, PSAT, FERR in the last 72 hours. No results found for: FOL, RBCF   No results for input(s): PH, PCO2, PO2 in the last 72 hours.   Recent Labs      03/01/17   1323   CPK  66     Lab Results   Component Value Date/Time    Cholesterol, total 133 10/19/2010 06:48 AM    HDL Cholesterol 50 10/19/2010 06:48 AM    LDL, calculated 69 10/19/2010 06:48 AM    Triglyceride 70 10/19/2010 06:48 AM    CHOL/HDL Ratio 2.7 10/19/2010 06:48 AM     Lab Results   Component Value Date/Time    Glucose (POC) 96 03/01/2017 02:22 PM    Glucose (POC) 74 03/01/2017 01:30 PM     Lab Results   Component Value Date/Time    Color YELLOW/STRAW 03/01/2017 01:51 PM    Appearance CLEAR 03/01/2017 01:51 PM    Specific gravity 1.015 03/01/2017 01:51 PM    pH (UA) 5.5 03/01/2017 01:51 PM    Protein TRACE 03/01/2017 01:51 PM    Glucose NEGATIVE  03/01/2017 01:51 PM    Ketone 40 03/01/2017 01:51 PM    Bilirubin NEGATIVE  03/01/2017 01:51 PM    Urobilinogen 1.0 03/01/2017 01:51 PM    Nitrites NEGATIVE  03/01/2017 01:51 PM    Leukocyte Esterase NEGATIVE  03/01/2017 01:51 PM    Epithelial cells FEW 03/01/2017 01:51 PM    Bacteria NEGATIVE  03/01/2017 01:51 PM    WBC 0-4 03/01/2017 01:51 PM    RBC 0-5 03/01/2017 01:51 PM         Medications Reviewed:     Current Facility-Administered Medications   Medication Dose Route Frequency    [START ON 3/3/2017] losartan (COZAAR) tablet 100 mg  100 mg Oral DAILY    losartan (COZAAR) tablet 50 mg  50 mg Oral ONCE    0.9% sodium chloride infusion  75 mL/hr IntraVENous CONTINUOUS    [START ON 3/3/2017] calcitRIOL (ROCALTROL) capsule 0.25 mcg  0.25 mcg Oral Once per day on Sun Tue Wed Fri Sat    colchicine tablet 0.3 mg  0.3 mg Oral DAILY    latanoprost (XALATAN) 0.005 % ophthalmic solution 1 Drop  1 Drop Both Eyes QHS    sodium chloride (NS) flush 5-10 mL  5-10 mL IntraVENous Q8H    sodium chloride (NS) flush 5-10 mL  5-10 mL IntraVENous PRN    acetaminophen (TYLENOL) tablet 650 mg  650 mg Oral Q4H PRN    ondansetron (ZOFRAN) injection 4 mg  4 mg IntraVENous Q4H PRN    enoxaparin (LOVENOX) injection 30 mg  30 mg SubCUTAneous Q24H     ______________________________________________________________________  EXPECTED LENGTH OF STAY: - - -  ACTUAL LENGTH OF STAY:          1                 Blue Hernandez MD

## 2017-03-02 NOTE — PROGRESS NOTES
Bedside shift change report given to Wendy Valladares (oncoming nurse) by Sydni Landry (offgoing nurse). Report included the following information SBAR and Kardex.

## 2017-03-02 NOTE — PROGRESS NOTES
Occupational Therapy:  Ms. Nolan Anne BP is very high this morning. Per nurse she has had some medication but they may be adjusted today. Will defer and check back later as available and appropriate.    Thank you  Eliecer Min MS, OTR/L

## 2017-03-02 NOTE — PROGRESS NOTES
Palliative Medicine Social Work     Progress Note:    Pt wrapped in blankets in a sitting position in bed, one blanket over her head. With eyes closed, pt responded faintly to questions. When asked if she'd rather be home, pt said, \"I don't want to be home. I can't take care of myself. \"  I acknowledged pt's being at home alone for several days, letting her know she is in a safe place now, is clean, and has food. Shira Bhandari came during this visit, but pt not interested in eating at this time. Pt preferred being left alone, but gave permission for my return tomorrow. Pt said, \"I appreciate your wanting to help. \"    Resuscitation Status: Full Code   Durable DNR addressed? [] Yes   [x] No    [] Not Applicable    Advance Care Planning 3/1/2017   Patient's Healthcare Decision Maker is: Legal Next of Kin   Primary Decision Maker Name Vlad Andrews   Primary Decision Maker Phone Number [phone]   Primary Decision Maker Relationship to Patient Sibling   Secondary Decision Maker Name self   Secondary Decision Maker Phone Number [phone]   Secondary Decision Maker Relationship to Patient Unknown   Confirm Advance Directive None   Patient Would Like to Complete Advance Directive No   Does the patient have other document types Other (comment)       Goals/Plans:   Visit pt tomorrow and make contact with family. Continue with care and support for pt during this hospitalization. Thank you for the opportunity to be involved in the care of Antwon Antony.     Omar Solis, ALEXANDERW, LSW, CCM I    [employer] ® ACP Facilitator   Palliative Medicine I [address] [place] [address]  [phone]

## 2017-03-02 NOTE — PROGRESS NOTES
Physical Therapy  Orders received today and chart reviewed. Attempted earlier this morning but nursing request to hold due to elevated blood pressure.    Unable to follow up later today and will follow up in the am.  Slade Plummer, PT

## 2017-03-02 NOTE — PROGRESS NOTES
Primary Nurse Jennifer Moyer RN and Eliecer Landon RN performed a dual skin assessment on this patient No impairment noted  Ketan score is 21

## 2017-03-02 NOTE — PROGRESS NOTES
NUTRITION COMPLETE ASSESSMENT    RECOMMENDATIONS:   1. Check A1c% if not checked recently  2. Encourage intake of meals and supplements      Interventions/Plan:   Food/Nutrient Delivery:    Commercial supplement       Boost Pudding BID (480cals. 14g Protein)     Nutrition Education:     Coordination of Care:    Nutrition Counseling:        Assessment:   Reason for Assessment:   [] Provider Consult  [x]BPA/MST Referral   []LOS   []Reassessment   []NPO/Clear Liquid   []At Nutrition Risk  []Other    Diet: Regular  Supplements: unknown PTA  Nutritionally Significant Medications: [x] Reviewed & Includes:  Calcitrol, colchinine, lovenox, cozaar    Meal Intake: Patient Vitals for the past 100 hrs:   % Diet Eaten   03/01/17 1757 100 %       Subjective:  Pt with dementia. Objective:  Pt admitted for generalized weakness. Her caregiver Abida Carrion was admitted to the hospital and no one was home to care for this patient. Neighbor called ambulance since patient had not taken prescriptions or eaten appropriately in several days. She is tolerating regular diet well without concern. Unclear if she has DM with elevated BG on admission but POC BG WDL without DM related medications. This may have been related to hydration and/or stress. Previous labs show renal insuff. However currently as expected with advanced age. Estimated Nutrition Needs:   Kcals/day: 1200 Kcals/day  Protein: 57 g (1.2 g/kg of current wt)  Fluid: 1200 ml (25 mL/kg of current wt)     Based On: Kcal/kg - specify (Comment) (25 kcal/kg of current wt)  Weight Used: Actual wt (47.6 kg)    Pt expected to meet estimated nutrient needs:    [x]   Yes     []  No       [] Unable to predict at this time      Nutrition Diagnosis:   1.  Self-monitoring deficit related to dementia as evidenced by medical diagnosis and inability to care for herself while sister has been admitted to hosptial;       Goals:    75% of meals and supplements tolerated daily while inpatient     Monitoring & Evaluation:    -     - Weight/weight change, Glucose profile   -      Previous Nutrition Goals Met:  N/A  Previous Recommendations:      N/A    Education & Discharge Needs:   [x] None Identified   [] Identified and addressed    [] Participated in care plan, discharge planning, and/or interdisciplinary rounds        Cultural, Zoroastrianism and ethnic food preferences identified:   None    Skin Integrity: [x]Intact  []Other  Edema: [x]None []Other  Last BM: 2/28/17  ABD: flat, active, WDL  Food Allergies: [x]None []Other    Anthropometrics:    Weight Loss Metrics 3/2/2017   Today's Wt 105 lb   BMI 20.51 kg/m2      Last 3 Recorded Weights in this Encounter    03/01/17 1307 03/02/17 1029   Weight: 47.6 kg (105 lb) 47.6 kg (105 lb)      Weight Source: Estimated  Height: 5' (152.4 cm),    Body mass index is 20.51 kg/(m^2). IBW : 45.4 kg (100 lb), % IBW (Calculated): 105 %   ,      Labs:  Lab Results   Component Value Date/Time    Sodium 133 03/02/2017 01:49 AM    Potassium 3.9 03/02/2017 01:49 AM    Chloride 97 03/02/2017 01:49 AM    CO2 26 03/02/2017 01:49 AM    Glucose 132 03/02/2017 01:49 AM    BUN 16 03/02/2017 01:49 AM    Creatinine 1.16 03/02/2017 01:49 AM    Calcium 9.4 03/02/2017 01:49 AM    Albumin 3.0 03/02/2017 01:49 AM     No results found for: HBA1C, HGBE8, QZQ3PNMF, TKY2KMMS  Lab Results   Component Value Date/Time    Glucose 132 03/02/2017 01:49 AM    Glucose (POC) 96 03/01/2017 02:22 PM      Lab Results   Component Value Date/Time    ALT (SGPT) 26 03/02/2017 01:49 AM    AST (SGOT) 22 03/02/2017 01:49 AM    Alk.  phosphatase 75 03/02/2017 01:49 AM    Bilirubin, total 0.8 03/02/2017 01:49 AM        Les Smaller, RD, MS, CDE

## 2017-03-02 NOTE — CDMP QUERY
Please give the clinical significance of the following lab data. Fx=240; 133    => Hyponatremia  =>Other Explanation of clinical findings  =>Unable to Determine (no explanation of clinical findings)    The medical record reflects the following clinical findings, treatment, and risk factors:    Risk Factors: dehydration  Clinical Indicators:  Na= 131 on admission  Treatment: NS IV    Please clarify and document your clinical opinion in the progress notes and discharge summary including the definitive and/or presumptive diagnosis, (suspected or probable), related to the above clinical findings. Please include clinical findings supporting your diagnosis.     Thank you,  Pete Monreal RN BSN [facility]  # [phone]

## 2017-03-02 NOTE — CONSULTS
Palliative Medicine Consult  [place]: [phone] ([phone])    Patient Name: Chidi Nielsen  YOB: 1929    Date of Initial Consult: 3/2/17  Reason for Consult: care decisions  Requesting Provider: Dr. Ezequiel Lane  Primary Care Physician: Chaarn Vidal MD      SUMMARY:   Chidi Nielsen is a 80 y.o. with a past history of dementia, hypertension, hypercalcemia, who was admitted on 3/1/2017 from home after the neighbor alerted EMS regarding patient not letting her into the house to check on her. Patient is visited or lives with patient and cared for by her twin sister everyday and when the neighbor did not see the sister around for 3 days tried to check on patient and was told by patient not to come in. Current medical issues leading to Palliative Medicine involvement include: patient remains confused, she has remained in bed for 3 days when her sister was unable to feed her/ care for her, need for care decisions. PALLIATIVE DIAGNOSES:   1. Delirium  2. Failure to thrive  3. debility  4. Paranoia     PLAN:   1. Patient in bed, appears comfortable. Oriented to self, time and place, tells me that she has not seen her sister who cares for her in a while and just found out that she is in the hospital. She admits to being alone when sister not around but didn't think about calling anyone to find out what happened to her or ask for help for herself. She was unable to provide any other meaningful history, was very paranoid with the questions I asked and kept repeating \" you cannot come into my house\"  2. Patient does not have capacity to make decisions regarding her care or discharge at this time. 3. I have enlisted the help of our  Soheila Grimm to help identify [facility] and arrange meeting to discuss care plans. 4. Initial consult note routed to primary continuity provider  5.  Communicated plan of care with: Palliative IDT       GOALS OF CARE / TREATMENT PREFERENCES:   [====Goals of Care====]  GOALS OF CARE:  Patient / health care proxy stated goals: FULL      TREATMENT PREFERENCES:   Code Status: Full Code    Advance Care Planning:  Advance Care Planning 3/1/2017   Patient's Healthcare Decision Maker is: Legal Next of Geetha Jarrett   Primary Decision Maker Name Rachel Celestin   Primary Decision Maker Phone Number [phone]   Primary Decision Maker Relationship to Patient Sibling   Secondary Decision Maker Name self   Secondary Decision Maker Phone Number [phone]   Secondary Decision Maker Relationship to Patient Unknown   Confirm Advance Directive None   Patient Would Like to Complete Advance Directive No   Does the patient have other document types Other (comment)       Other:    The palliative care team has discussed with patient / health care proxy about goals of care / treatment preferences for patient.  [====Goals of Care====]         HISTORY:       HPI/SUBJECTIVE:    The patient is:   [x] Verbal and participatory  [] Non-participatory due to:     Patient appears very guarded, wants to know why I am asking all these questions. She wants to go home but unable to tell me how she is going to be cared for and other practical issues. Denies any complaints. Per patient, her sister prepares food, she eats what she wants, her sister never forces her. She is able to get to the bathroom with some help but most days she \"just goes\". Unable to provide any further history.     Clinical Pain Assessment (nonverbal scale for severity on nonverbal patients):   [++++ Clinical Pain Assessment++++]  [++++Pain Severity++++]:    [++++Pain Character++++]:   [++++Pain Duration++++]:   [++++Pain Effect++++]:   [++++Pain Factors++++]:   [++++Pain Frequency++++]:   [++++Pain Location++++]:   [++++ Clinical Pain Assessment++++]     FUNCTIONAL ASSESSMENT:     Palliative Performance Scale (PPS):  PPS: 50       PSYCHOSOCIAL/SPIRITUAL SCREENING:     Advance Care Planning:  Advance Care Planning 3/1/2017   Patient's Healthcare Decision Maker is: Legal Next of Kin   Primary Decision Maker Name hCloe Mustafa   Primary Decision Maker Phone Number [phone]   Primary Decision Maker Relationship to Patient Sibling   Secondary Decision Maker Name self   Secondary Decision Maker Phone Number [phone]   Secondary Decision Maker Relationship to Patient Unknown   Confirm Advance Directive None   Patient Would Like to Complete Advance Directive No   Does the patient have other document types Other (comment)               REVIEW OF SYSTEMS:     Positive and pertinent negative findings in ROS are noted above in HPI. The following systems were [] reviewed / [] unable to be reviewed as noted in HPI  Other findings are noted below. Systems: constitutional, ears/nose/mouth/throat, respiratory, gastrointestinal, genitourinary, musculoskeletal, integumentary, neurologic, psychiatric, endocrine. Positive findings noted below. Modified ESAS Completed by: provider   Fatigue: 4 Drowsiness: 0   Depression: 0     Anxiety: 4 Nausea: 0   Anorexia: 4 Dyspnea: 0     Constipation: No              PHYSICAL EXAM:     From RN flowsheet:  Wt Readings from Last 3 Encounters:   03/02/17 105 lb (47.6 kg)     Blood pressure (!) 209/93, pulse 80, temperature 98.9 °F (37.2 °C), resp. rate 18, height 5' (1.524 m), weight 105 lb (47.6 kg), SpO2 100 %, not currently breastfeeding. Pain Scale 1: Numeric (0 - 10)  Pain Intensity 1: 0     Pain Location 1: Neck     Pain Description 1: Aching     Last bowel movement, if known:     Constitutional: alert, oriented to self, place and year, thin built patient.   Eyes: pupils equal, anicteric  ENMT: no nasal discharge, moist mucous membranes  Cardiovascular: regular rhythm, distal pulses intact  Respiratory: breathing not labored, symmetric  Gastrointestinal: soft non-tender, +bowel sounds  Musculoskeletal: no deformity, no tenderness to palpation  Skin: warm, dry  Neurologic: following commands, moving all extremities  Psychiatric: some paranoid accusations  Other:       HISTORY:     Active Problems:    Generalized weakness (3/1/2017)      No past medical history on file. No past surgical history on file. No family history on file. History reviewed, no pertinent family history. Social History   Substance Use Topics    Smoking status: Not on file    Smokeless tobacco: Not on file    Alcohol use Not on file     Allergies   Allergen Reactions    Penicillins Unknown (comments)     Hx dementia      Current Facility-Administered Medications   Medication Dose Route Frequency    [START ON 3/3/2017] losartan (COZAAR) tablet 100 mg  100 mg Oral DAILY    0.9% sodium chloride infusion  75 mL/hr IntraVENous CONTINUOUS    [START ON 3/3/2017] calcitRIOL (ROCALTROL) capsule 0.25 mcg  0.25 mcg Oral Once per day on Sun Tue Wed Fri Sat    colchicine tablet 0.3 mg  0.3 mg Oral DAILY    latanoprost (XALATAN) 0.005 % ophthalmic solution 1 Drop  1 Drop Both Eyes QHS    sodium chloride (NS) flush 5-10 mL  5-10 mL IntraVENous Q8H    sodium chloride (NS) flush 5-10 mL  5-10 mL IntraVENous PRN    acetaminophen (TYLENOL) tablet 650 mg  650 mg Oral Q4H PRN    ondansetron (ZOFRAN) injection 4 mg  4 mg IntraVENous Q4H PRN    enoxaparin (LOVENOX) injection 30 mg  30 mg SubCUTAneous Q24H          LAB AND IMAGING FINDINGS:     Lab Results   Component Value Date/Time    WBC 10.4 03/01/2017 01:23 PM    HGB 12.7 03/01/2017 01:23 PM    PLATELET 957 94/84/7091 01:23 PM     Lab Results   Component Value Date/Time    Sodium 133 03/02/2017 01:49 AM    Potassium 3.9 03/02/2017 01:49 AM    Chloride 97 03/02/2017 01:49 AM    CO2 26 03/02/2017 01:49 AM    BUN 16 03/02/2017 01:49 AM    Creatinine 1.16 03/02/2017 01:49 AM    Calcium 9.4 03/02/2017 01:49 AM      Lab Results   Component Value Date/Time    AST (SGOT) 22 03/02/2017 01:49 AM    Alk.  phosphatase 75 03/02/2017 01:49 AM    Protein, total 6.6 03/02/2017 01:49 AM    Albumin 3.0 03/02/2017 01:49 AM    Globulin 3.6 03/02/2017 01:49 AM     No results found for: INR, PTMR, PTP, PT1, PT2, APTT   Lab Results   Component Value Date/Time    Iron 55 03/02/2017 01:49 AM    TIBC 174 03/02/2017 01:49 AM    Iron % saturation 32 03/02/2017 01:49 AM      No results found for: PH, PCO2, PO2  No components found for: [place]   Lab Results   Component Value Date/Time    CK 66 03/01/2017 01:23 PM                Total time: 50 mins  Counseling / coordination time: 40 mins   > 50% counseling / coordination?: y    Prolonged service was provided for  []30 min   []75 min in face to face time in the presence of the patient. Time Start:   Time End:   Note: this can only be billed with 28923 (initial) or 92153 (follow up). If multiple start / stop times, list each separately.

## 2017-03-03 LAB
25(OH)D3 SERPL-MCNC: 33.3 NG/ML (ref 30–100)
ALBUMIN SERPL BCP-MCNC: 2.5 G/DL (ref 3.5–5)
ALBUMIN/GLOB SERPL: 0.7 {RATIO} (ref 1.1–2.2)
ALP SERPL-CCNC: 71 U/L (ref 45–117)
ALT SERPL-CCNC: 25 U/L (ref 12–78)
ANION GAP BLD CALC-SCNC: 9 MMOL/L (ref 5–15)
APPEARANCE UR: CLEAR
AST SERPL W P-5'-P-CCNC: 19 U/L (ref 15–37)
BACTERIA URNS QL MICRO: ABNORMAL /HPF
BASOPHILS # BLD AUTO: 0 K/UL (ref 0–0.1)
BASOPHILS # BLD: 0 % (ref 0–1)
BILIRUB SERPL-MCNC: 0.7 MG/DL (ref 0.2–1)
BILIRUB UR QL: NEGATIVE
BUN SERPL-MCNC: 15 MG/DL (ref 6–20)
BUN/CREAT SERPL: 13 (ref 12–20)
CALCIUM SERPL-MCNC: 8.8 MG/DL (ref 8.5–10.1)
CHLORIDE SERPL-SCNC: 96 MMOL/L (ref 97–108)
CO2 SERPL-SCNC: 26 MMOL/L (ref 21–32)
COLOR UR: ABNORMAL
CORTIS SERPL-MCNC: 28 UG/DL
CREAT SERPL-MCNC: 1.2 MG/DL (ref 0.55–1.02)
EOSINOPHIL # BLD: 0 K/UL (ref 0–0.4)
EOSINOPHIL NFR BLD: 0 % (ref 0–7)
EPITH CASTS URNS QL MICRO: ABNORMAL /LPF
ERYTHROCYTE [DISTWIDTH] IN BLOOD BY AUTOMATED COUNT: 13.9 % (ref 11.5–14.5)
GLOBULIN SER CALC-MCNC: 3.4 G/DL (ref 2–4)
GLUCOSE SERPL-MCNC: 108 MG/DL (ref 65–100)
GLUCOSE UR STRIP.AUTO-MCNC: NEGATIVE MG/DL
HCT VFR BLD AUTO: 31.9 % (ref 35–47)
HGB BLD-MCNC: 11.2 G/DL (ref 11.5–16)
HGB UR QL STRIP: ABNORMAL
KETONES UR QL STRIP.AUTO: NEGATIVE MG/DL
LEUKOCYTE ESTERASE UR QL STRIP.AUTO: ABNORMAL
LYMPHOCYTES # BLD AUTO: 16 % (ref 12–49)
LYMPHOCYTES # BLD: 2.2 K/UL (ref 0.8–3.5)
MAGNESIUM SERPL-MCNC: 1.2 MG/DL (ref 1.6–2.4)
MCH RBC QN AUTO: 30.4 PG (ref 26–34)
MCHC RBC AUTO-ENTMCNC: 35.1 G/DL (ref 30–36.5)
MCV RBC AUTO: 86.4 FL (ref 80–99)
MONOCYTES # BLD: 1.1 K/UL (ref 0–1)
MONOCYTES NFR BLD AUTO: 8 % (ref 5–13)
NEUTS SEG # BLD: 10.2 K/UL (ref 1.8–8)
NEUTS SEG NFR BLD AUTO: 76 % (ref 32–75)
NITRITE UR QL STRIP.AUTO: NEGATIVE
PH UR STRIP: 7 [PH] (ref 5–8)
PHOSPHATE SERPL-MCNC: 1.6 MG/DL (ref 2.6–4.7)
PLATELET # BLD AUTO: 203 K/UL (ref 150–400)
POTASSIUM SERPL-SCNC: 3.6 MMOL/L (ref 3.5–5.1)
PROT SERPL-MCNC: 5.9 G/DL (ref 6.4–8.2)
PROT UR STRIP-MCNC: NEGATIVE MG/DL
RBC # BLD AUTO: 3.69 M/UL (ref 3.8–5.2)
RBC #/AREA URNS HPF: ABNORMAL /HPF (ref 0–5)
SODIUM SERPL-SCNC: 131 MMOL/L (ref 136–145)
SP GR UR REFRACTOMETRY: <1.005 (ref 1–1.03)
T3FREE SERPL-MCNC: 1.3 PG/ML (ref 2.2–4)
T4 FREE SERPL-MCNC: 1.5 NG/DL (ref 0.8–1.5)
TSH SERPL DL<=0.05 MIU/L-ACNC: 0.24 UIU/ML (ref 0.36–3.74)
UA: UC IF INDICATED,UAUC: ABNORMAL
UROBILINOGEN UR QL STRIP.AUTO: 0.2 EU/DL (ref 0.2–1)
WBC # BLD AUTO: 13.6 K/UL (ref 3.6–11)
WBC URNS QL MICRO: ABNORMAL /HPF (ref 0–4)

## 2017-03-03 PROCEDURE — 74011250636 HC RX REV CODE- 250/636: Performed by: INTERNAL MEDICINE

## 2017-03-03 PROCEDURE — 97162 PT EVAL MOD COMPLEX 30 MIN: CPT

## 2017-03-03 PROCEDURE — 87186 SC STD MICRODIL/AGAR DIL: CPT | Performed by: INTERNAL MEDICINE

## 2017-03-03 PROCEDURE — 84100 ASSAY OF PHOSPHORUS: CPT | Performed by: INTERNAL MEDICINE

## 2017-03-03 PROCEDURE — 82306 VITAMIN D 25 HYDROXY: CPT | Performed by: INTERNAL MEDICINE

## 2017-03-03 PROCEDURE — L4396 STATIC OR DYNAMI AFO PRE CST: HCPCS

## 2017-03-03 PROCEDURE — 81001 URINALYSIS AUTO W/SCOPE: CPT | Performed by: INTERNAL MEDICINE

## 2017-03-03 PROCEDURE — 97530 THERAPEUTIC ACTIVITIES: CPT

## 2017-03-03 PROCEDURE — 77030011943

## 2017-03-03 PROCEDURE — 74011250636 HC RX REV CODE- 250/636: Performed by: HOSPITALIST

## 2017-03-03 PROCEDURE — 36415 COLL VENOUS BLD VENIPUNCTURE: CPT | Performed by: INTERNAL MEDICINE

## 2017-03-03 PROCEDURE — 82533 TOTAL CORTISOL: CPT | Performed by: INTERNAL MEDICINE

## 2017-03-03 PROCEDURE — 85025 COMPLETE CBC W/AUTO DIFF WBC: CPT | Performed by: INTERNAL MEDICINE

## 2017-03-03 PROCEDURE — 74011250637 HC RX REV CODE- 250/637: Performed by: HOSPITALIST

## 2017-03-03 PROCEDURE — 84481 FREE ASSAY (FT-3): CPT | Performed by: INTERNAL MEDICINE

## 2017-03-03 PROCEDURE — 65270000032 HC RM SEMIPRIVATE

## 2017-03-03 PROCEDURE — 80053 COMPREHEN METABOLIC PANEL: CPT | Performed by: INTERNAL MEDICINE

## 2017-03-03 PROCEDURE — 84439 ASSAY OF FREE THYROXINE: CPT | Performed by: INTERNAL MEDICINE

## 2017-03-03 PROCEDURE — 51798 US URINE CAPACITY MEASURE: CPT

## 2017-03-03 PROCEDURE — 83735 ASSAY OF MAGNESIUM: CPT | Performed by: INTERNAL MEDICINE

## 2017-03-03 PROCEDURE — 97165 OT EVAL LOW COMPLEX 30 MIN: CPT

## 2017-03-03 PROCEDURE — 74011250637 HC RX REV CODE- 250/637: Performed by: INTERNAL MEDICINE

## 2017-03-03 PROCEDURE — 87086 URINE CULTURE/COLONY COUNT: CPT | Performed by: INTERNAL MEDICINE

## 2017-03-03 PROCEDURE — 84443 ASSAY THYROID STIM HORMONE: CPT | Performed by: INTERNAL MEDICINE

## 2017-03-03 PROCEDURE — 87077 CULTURE AEROBIC IDENTIFY: CPT | Performed by: INTERNAL MEDICINE

## 2017-03-03 RX ADMIN — CALCITRIOL 0.25 MCG: 0.25 CAPSULE, LIQUID FILLED ORAL at 08:27

## 2017-03-03 RX ADMIN — LATANOPROST 1 DROP: 50 SOLUTION OPHTHALMIC at 23:51

## 2017-03-03 RX ADMIN — Medication 10 ML: at 23:52

## 2017-03-03 RX ADMIN — LOSARTAN POTASSIUM 100 MG: 50 TABLET ORAL at 08:24

## 2017-03-03 RX ADMIN — Medication 10 ML: at 14:00

## 2017-03-03 RX ADMIN — ENOXAPARIN SODIUM 30 MG: 30 INJECTION SUBCUTANEOUS at 17:28

## 2017-03-03 RX ADMIN — Medication 10 ML: at 06:06

## 2017-03-03 RX ADMIN — COLCHICINE 0.3 MG: 0.6 TABLET, FILM COATED ORAL at 08:24

## 2017-03-03 RX ADMIN — SODIUM CHLORIDE 75 ML/HR: 900 INJECTION, SOLUTION INTRAVENOUS at 15:13

## 2017-03-03 NOTE — WOUND CARE
Wound Consult:  New Patient Visit. Chart reviewed. Spoke with patients nurse,  Cyndi Dillon and we were at bedside together; yCndi Dillon reports left heel redness was noted and staff consulted and placed off loading heel boot. Patient is resting on a Advance bed with pressure prevention foam mattress. Patient tells us that with boot on her heel is not sore as it was yesterday. Assessment:  Left heel - pink, blanching intact skin with small area that feels and looks like light colored callus at top of heel. No pressure injury has occurred - staff identified and provided effective off loading. Right heel - no real redness is noted. Sacrum/buttocks - intact, no discoloration. Treatment:  Barrier cream (aloe vesta) to buttocks/sacrum for incontinence care. Also moisturized both feet with same. Placed Prevalon boot to right foot as well - both off loaded with boots. Recommendations:  1. Minimize layers of linen/pads under patient to optimize support surface. 2. Reposition: continue to turn and reposition approximately every 2 hours; float heels or use Prevalon boots. 3. Manage incontinence - check ~  every 2 hours; routine incontinence care to include skin/perineal cleanser; moisturizer and barrier with each episode; using brief at this time. 4. Continue to monitor nutrition, pain, and skin risk scale, and skin assessment. Plan:  Spoke with Dr. Mohsen Murphy regarding findings and obtained orders for treatment. We will continue to reassess routinely and as needed.   Sean Weber, [address] [facility] Office [phone]  Pager [phone]

## 2017-03-03 NOTE — PROGRESS NOTES
Bedside shift change report given to Mason Gore (oncoming nurse) by Nisreen Rojas (offgoing nurse). Report included the following information SBAR, Kardex, MAR and Recent Results. Pt c/o of burning with urination & pain in her left heel upon initial shift assessment. Heel is red with sluggish blanching. Applied heel boot, floated, rt heel, and put in for wound consult and turn team q2. Urine is dark & malodorous. Please share with care team with morning rounds. Results for Maynor Simms (MRN [id number]) as of 3/3/2017 06:19   Ref. Range 3/3/2017 03:36   WBC Latest Ref Range: 3.6 - 11.0 K/uL 13.6 (H)   RBC Latest Ref Range: 3.80 - 5.20 M/uL 3.69 (L)   HGB Latest Ref Range: 11.5 - 16.0 g/dL 11.2 (L)   HCT Latest Ref Range: 35.0 - 47.0 % 31.9 (L)   MCV Latest Ref Range: 80.0 - 99.0 FL 86.4   MCH Latest Ref Range: 26.0 - 34.0 PG 30.4   MCHC Latest Ref Range: 30.0 - 36.5 g/dL 35.1   RDW Latest Ref Range: 11.5 - 14.5 % 13.9   PLATELET Latest Ref Range: 150 - 400 K/uL 203   NEUTROPHILS Latest Ref Range: 32 - 75 % 76 (H)   LYMPHOCYTES Latest Ref Range: 12 - 49 % 16   MONOCYTES Latest Ref Range: 5 - 13 % 8   EOSINOPHILS Latest Ref Range: 0 - 7 % 0   BASOPHILS Latest Ref Range: 0 - 1 % 0   ABS. NEUTROPHILS Latest Ref Range: 1.8 - 8.0 K/UL 10.2 (H)   ABS. LYMPHOCYTES Latest Ref Range: 0.8 - 3.5 K/UL 2.2   ABS. MONOCYTES Latest Ref Range: 0.0 - 1.0 K/UL 1.1 (H)   ABS. EOSINOPHILS Latest Ref Range: 0.0 - 0.4 K/UL 0.0   ABS.  BASOPHILS Latest Ref Range: 0.0 - 0.1 K/UL 0.0   Sodium Latest Ref Range: 136 - 145 mmol/L 131 (L)   Potassium Latest Ref Range: 3.5 - 5.1 mmol/L 3.6   Chloride Latest Ref Range: 97 - 108 mmol/L 96 (L)   CO2 Latest Ref Range: 21 - 32 mmol/L 26   Anion gap Latest Ref Range: 5 - 15 mmol/L 9   Glucose Latest Ref Range: 65 - 100 mg/dL 108 (H)   BUN Latest Ref Range: 6 - 20 MG/DL 15   Creatinine Latest Ref Range: 0.55 - 1.02 MG/DL 1.20 (H)   BUN/Creatinine ratio Latest Ref Range: 12 - 20   13   Calcium Latest Ref Range: 8.5 - 10.1 MG/DL 8.8   Phosphorus Latest Ref Range: 2.6 - 4.7 MG/DL 1.6 (L)   Magnesium Latest Ref Range: 1.6 - 2.4 mg/dL 1.2 (L)   GFR est non-AA Latest Ref Range: >60 ml/min/1.73m2 42 (L)   GFR est AA Latest Ref Range: >60 ml/min/1.73m2 52 (L)   Bilirubin, total Latest Ref Range: 0.2 - 1.0 MG/DL 0.7   Protein, total Latest Ref Range: 6.4 - 8.2 g/dL 5.9 (L)   Albumin Latest Ref Range: 3.5 - 5.0 g/dL 2.5 (L)   Globulin Latest Ref Range: 2.0 - 4.0 g/dL 3.4   A-G Ratio Latest Ref Range: 1.1 - 2.2   0.7 (L)   ALT Latest Ref Range: 12 - 78 U/L 25   AST Latest Ref Range: 15 - 37 U/L 19   Alk.  phosphatase Latest Ref Range: 45 - 117 U/L 71   Cortisol, random Latest Units: ug/dL 28.0   Free Triiodothyronine Latest Ref Range: 2.2 - 4.0 pg/mL 1.3 (L)   T4, Free Latest Ref Range: 0.8 - 1.5 NG/DL 1.5   TSH Latest Ref Range: 0.36 - 3.74 uIU/mL 0.24 (L)   T3, FREE Unknown Rpt (A)   VITAMIN D, 25 HYDROXY Unknown Rpt

## 2017-03-03 NOTE — PROGRESS NOTES
Problem: Mobility Impaired (Adult and Pediatric)  Goal: *Acute Goals and Plan of Care (Insert Text)  Physical Therapy Goals  Initiated 3/3/2017  1. Patient will move from supine to sit and sit to supine and roll side to side in bed with minimal assistance/contact guard assist within 7 day(s). 2. Patient will transfer from bed to chair and chair to bed with minimal assistance/contact guard assist using the least restrictive device within 7 day(s). 3. Patient will perform sit to stand with minimal assistance/contact guard assist within 7 day(s). 4. Patient will ambulate with minimal assistance/contact guard assist for 50 feet with the least restrictive device within 7 day(s). PHYSICAL THERAPY EVALUATION  Patient: Margaret Ortiz (89 y.o. female)  Date: 3/3/2017  Primary Diagnosis: Generalized weakness        Precautions: fall         ASSESSMENT :  Based on the objective data described below, the patient presents with overall significantly impaired mobility but also appears to be limited due to her fear and anxiety. Patient very hesitant to move her extremities and holds self very stiff. Patient constantly reassured of safe place and the need to begin mobilizing in order to regain her strength. Vitals monitored and blood pressure stable during assessment. Was able to get her transferred to the chair but very little assistance from  Patient due to fear. Left up in chair and lunch set up for her to eat. Recommend up to chair over the weekend with 1-2 person assist for safety and patient reassurance. Will need SNF at discharge to maximize her mobility. .     Patient will benefit from skilled intervention to address the above impairments.   Patients rehabilitation potential is considered to be Fair  Factors which may influence rehabilitation potential include:   [ ]         None noted  [X]         Mental ability/status  [ ]         Medical condition  [X]         Home/family situation and support systems  [X] Safety awareness  [ ]         Pain tolerance/management  [ ]         Other:        PLAN :  Recommendations and Planned Interventions:  [X]           Bed Mobility Training             [ ]    Neuromuscular Re-Education  [X]           Transfer Training                   [ ]    Orthotic/Prosthetic Training  [X]           Gait Training                         [ ]    Modalities  [X]           Therapeutic Exercises           [ ]    Edema Management/Control  [X]           Therapeutic Activities            [X]    Patient and Family Training/Education  [ ]           Other (comment):     Frequency/Duration: Patient will be followed by physical therapy  5 times a week to address goals. Discharge Recommendations: Skilled Nursing Facility  Further Equipment Recommendations for Discharge: none       SUBJECTIVE:   Patient stated I am too weak to move.       OBJECTIVE DATA SUMMARY:   HISTORY:    No past medical history on file. No past surgical history on file. Prior Level of Function/Home Situation: was ambulatory but states frequent falls. Overall patient with dementia and unclear of full mobility status.   Apparently sister lives nearby and assists her but she is currently in the hospital  Personal factors and/or comorbidities impacting plan of care:      Home Situation  Home Environment: Private residence  # Steps to Enter: 1  One/Two Story Residence: One story  Living Alone: Yes (sister lives nearby)  Support Systems: Family member(s)  Patient Expects to be Discharged to[de-identified] Skilled nursing facility  Current DME Used/Available at Home: [address] [facility] [place] (in the community)     EXAMINATION/PRESENTATION/DECISION MAKING:   Critical Behavior:  Neurologic State: Alert  Orientation Level: Oriented to person, Oriented to place  Cognition: Follows commands     Skin:  Has bilateral heel boot protectors  Strength:    Strength: Grossly decreased, non-functional         Tone & Sensation:   Tone: Normal          Range Of Motion:  AROM: Grossly decreased, non-functional   Maybe more fear related        PROM: Generally decreased, functional           Coordination:  Coordination: Grossly decreased, non-functional     Functional Mobility:  Bed Mobility:  Rolling: Maximum assistance  Supine to Sit: Maximum assistance     Scooting: Moderate assistance  Transfers:  Sit to Stand: Maximum assistance  Stand to Sit: Maximum assistance  Stand Pivot Transfers: Maximum assistance                    Balance:   Sitting: Impaired; Without support  Sitting - Static: Fair (occasional)  Sitting - Dynamic: Fair (occasional)  Standing: Impaired; With support  Standing - Static: Poor  Standing - Dynamic : Poor  Ambulation/Gait Training:              Gait Description (WDL):  (unable at this time)                           Functional Measure:  Tinetti test:      Sitting Balance: 1  Arises: 0  Attempts to Rise: 0  Immediate Standing Balance: 0  Standing Balance: 0  Nudged: 0  Eyes Closed: 0  Turn 360 Degrees - Continuous/Discontinuous: 0  Turn 360 Degrees - Steady/Unsteady: 0  Sitting Down: 0  Balance Score: 1  Indication of Gait: 0  R Step Length/Height: 0  L Step Length/Height: 0  R Foot Clearance: 0  L Foot Clearance: 0  Step Symmetry: 0  Step Continuity: 0  Path: 0  Trunk: 0  Walking Time: 0  Gait Score: 0  Total Score: 1         Tinetti Test and G-code impairment scale:  Percentage of Impairment CH     0%    CI     1-19% CJ     20-39% CK     40-59% CL     60-79% CM     80-99% CN      100%   Tinetti  Score 0-28 28 23-27 17-22 12-16 6-11 1-5 0          Tinetti Tool Score Risk of Falls  <19 = High Fall Risk  19-24 = Moderate Fall Risk  25-28 = Low Fall Risk  Tinetti ME. Performance-Oriented Assessment of Mobility Problems in Elderly Patients. [address] 66; [id number].  (Scoring Description: PT Bulletin Feb. 10, 1993)     Older adults: Josh Pantoja et al, 2009; n = [address] elderly evaluated with ABC, PANKAJ, ADL, and IADL)  · Mean PANKAJ score for males aged 69-68 years = 26.21(3.40)  · Mean PANKAJ score for females age 69-68 years = 25.16(4.30)  · Mean PANKAJ score for males over 80 years = 23.29(6.02)  · Mean PANKAJ score for females over 80 years = 17.20(8.32)            G codes: In compliance with CMSs Claims Based Outcome Reporting, the following G-code set was chosen for this patient based on their primary functional limitation being treated: The outcome measure chosen to determine the severity of the functional limitation was the tinetti with a score of 1/28 which was correlated with the impairment scale. · Mobility - Walking and Moving Around:               - CURRENT STATUS:    CM - 80%-99% impaired, limited or restricted               - GOAL STATUS:           CL - 60%-79% impaired, limited or restricted               - D/C STATUS:                       ---------------To be determined---------------    Pain:  Pain Scale 1: Numeric (0 - 10)  Pain Intensity 1: 0            Activity Tolerance:   Blood pressure stable  Please refer to the flowsheet for vital signs taken during this treatment. After treatment:   [X]         Patient left in no apparent distress sitting up in chair  [ ]         Patient left in no apparent distress in bed  [X]         Call bell left within reach  [X]         Nursing notified  [ ]         Caregiver present  [ ]         Bed alarm activated      COMMUNICATION/EDUCATION:   The patients plan of care was discussed with: Registered Nurse. [ ]         Fall prevention education was provided and the patient/caregiver indicated understanding. [ ]         Patient/family have participated as able in goal setting and plan of care. [ ]         Patient/family agree to work toward stated goals and plan of care. [ ]         Patient understands intent and goals of therapy, but is neutral about his/her participation. [X]         Patient is unable to participate in goal setting and plan of care.      Thank you for this referral.  Teddy Bustos, PT   Time Calculation: 25 mins

## 2017-03-03 NOTE — PROGRESS NOTES
Hospitalist Progress Note  Office: [phone]      Date of Service:  3/3/2017  NAME:  Emma Nair  :  1929  MRN:  [id number]      Admission Summary:   79 yo woman with dementia, hypercalcemia, and HTN was BIBEMS from home on 3/1/17 with FTT, not eating or taking meds for the last 2-3 days since her sister/caregiver was admitted to [facility]. Interval history / Subjective:   Denies complaints; per nurse, BP still high but better     Assessment & Plan:     Failure to thrive/generalized weakness (POA)  - likely due to decreased po intake due to sister/caregiver's admission to hospital  - continue IVF  - PT/OT evals  - vitamin D, B12 WNL  - may need Nutrition consult for supplements  - UCx 3/3 pending  - check prealbumin    Subclinical hypothyroidism  - low TSH but normal free T4  - recheck in 4-6 weeks    Hypertension, uncontrolled (POA)  - likely due to not taking meds  - increased losartan 100mg po daily with hold parameters  - monitor while on IVF    Hypercalcemia (POA)  - possibly due to dehydration although cannot r/o occult malignancy  - resolved with IVF  - continue calcitriol for now    Hyponatremia (POA)  - likely due to decreased po fluid intake  - TSH  - cortisol WNL  - improving with IVF    Code status: Full  DVT prophylaxis: enoxaparin    Care Plan discussed with: Patient/Family, Nurse and   Disposition: TBD. Came from home.  Discharge to [address] [place] [address] on Monday 3/6 if medically stable     Hospital Problems  Never Reviewed          Codes Class Noted POA    Debility ICD-10-CM: R53.81  ICD-9-CM: 799.3  3/2/2017 Unknown        Paranoia ([facility] [address] ICD-10-CM: F22  ICD-9-CM: 297.1  3/2/2017 Unknown        Anxiety ICD-10-CM: F41.9  ICD-9-CM: 300.00  3/2/2017 Unknown        Advanced care planning/counseling discussion ICD-10-CM: Z71.89  ICD-9-CM: V65.49  3/2/2017 Unknown        Generalized weakness ICD-10-CM: R53.1  ICD-9-CM: 780.79  3/1/2017 Unknown            Review of Systems:   Pertinent items are noted in HPI. Vital Signs:    Last 24hrs VS reviewed since prior progress note. Most recent are:  Visit Vitals    BP (P) 166/80 (BP 1 Location: Left arm)    Pulse 75    Temp 98.7 °F (37.1 °C)    Resp 18    Ht 5' (1.524 m)    Wt 47.6 kg (105 lb)    SpO2 100%    Breastfeeding No    BMI 20.51 kg/m2     No intake or output data in the 24 hours ending 03/03/17 1405     Physical Examination:     Constitutional:  awake, no acute distress, cooperative, pleasantly confused, rambling speech    ENT:  oral mucosa dry, oropharynx benign; neck supple   Resp:  CTA bilaterally, no wheezing/rhonchi/rales   CV:  regular rhythm, normal rate, no m/r/g appreciated, no edema, +pulses    GI:  +BS, soft, non distended, non tender     Musculoskeletal:  moves all extremities    Neurologic:   AAOx3, NFD     Skin:  warm, dry  Eyes:  PERRL    Data Review:    Review and/or order of clinical lab test  Review and/or order of tests in the radiology section of CPT  Review and/or order of tests in the medicine section of CPT    Labs:     Recent Labs      03/03/17 0336 03/01/17   1323   WBC  13.6*  10.4   HGB  11.2*  12.7   HCT  31.9*  36.7   PLT  203  247     Recent Labs      03/03/17   0336  03/02/17   0149 03/01/17   1323   NA  131*  133*  131*   K  3.6  3.9  4.1   CL  96*  97  93*   CO2  26  26  25   BUN  15  16  20   CREA  1.20*  1.16*  1.37*   GLU  108*  132*  82   CA  8.8  9.4  10.7*   MG  1.2*   --    --    PHOS  1.6*   --    --      Recent Labs      03/03/17   0336  03/02/17 0149 03/01/17   1323   SGOT  19  22  28   ALT  25  26  33   AP  71  75  84   TBILI  0.7  0.8  0.8   TP  5.9*  6.6  8.2   ALB  2.5*  3.0*  3.8   GLOB  3.4  3.6  4.4*     No results for input(s): INR, PTP, APTT in the last 72 hours.     No lab exists for component: INREXT, INREXT   Recent Labs      03/02/17   0149   FE  55   TIBC  174*   PSAT  32      Lab Results Component Value Date/Time    Folate 14.4 03/02/2017 01:49 AM      No results for input(s): PH, PCO2, PO2 in the last 72 hours.   Recent Labs      03/01/17   1323   CPK  66     Lab Results   Component Value Date/Time    Cholesterol, total 133 10/19/2010 06:48 AM    HDL Cholesterol 50 10/19/2010 06:48 AM    LDL, calculated 69 10/19/2010 06:48 AM    Triglyceride 70 10/19/2010 06:48 AM    CHOL/HDL Ratio 2.7 10/19/2010 06:48 AM     Lab Results   Component Value Date/Time    Glucose (POC) 96 03/01/2017 02:22 PM    Glucose (POC) 74 03/01/2017 01:30 PM     Lab Results   Component Value Date/Time    Color YELLOW/STRAW 03/03/2017 10:27 AM    Appearance CLEAR 03/03/2017 10:27 AM    Specific gravity <1.005 03/03/2017 10:27 AM    pH (UA) 7.0 03/03/2017 10:27 AM    Protein NEGATIVE  03/03/2017 10:27 AM    Glucose NEGATIVE  03/03/2017 10:27 AM    Ketone NEGATIVE  03/03/2017 10:27 AM    Bilirubin NEGATIVE  03/03/2017 10:27 AM    Urobilinogen 0.2 03/03/2017 10:27 AM    Nitrites NEGATIVE  03/03/2017 10:27 AM    Leukocyte Esterase MODERATE 03/03/2017 10:27 AM    Epithelial cells FEW 03/03/2017 10:27 AM    Bacteria 1+ 03/03/2017 10:27 AM    WBC 5-10 03/03/2017 10:27 AM    RBC 0-5 03/03/2017 10:27 AM         Medications Reviewed:     Current Facility-Administered Medications   Medication Dose Route Frequency    losartan (COZAAR) tablet 100 mg  100 mg Oral DAILY    0.9% sodium chloride infusion  75 mL/hr IntraVENous CONTINUOUS    calcitRIOL (ROCALTROL) capsule 0.25 mcg  0.25 mcg Oral Once per day on Sun Tue Wed Fri Sat    colchicine tablet 0.3 mg  0.3 mg Oral DAILY    latanoprost (XALATAN) 0.005 % ophthalmic solution 1 Drop  1 Drop Both Eyes QHS    sodium chloride (NS) flush 5-10 mL  5-10 mL IntraVENous Q8H    sodium chloride (NS) flush 5-10 mL  5-10 mL IntraVENous PRN    acetaminophen (TYLENOL) tablet 650 mg  650 mg Oral Q4H PRN    ondansetron (ZOFRAN) injection 4 mg  4 mg IntraVENous Q4H PRN    enoxaparin (LOVENOX) injection 30 mg  30 mg SubCUTAneous Q24H     ______________________________________________________________________  EXPECTED LENGTH OF STAY: 2d 16h  ACTUAL LENGTH OF STAY:          2                 Luis Encarnacion MD

## 2017-03-03 NOTE — PROGRESS NOTES
Problem: Self Care Deficits Care Plan (Adult)  Goal: *Acute Goals and Plan of Care (Insert Text)  Occupational Therapy Goals  Initiated 3/3/17  1. Pt will complete bathing with MIN A within 7 days. 2. Pt will complete toilet transfers with MOD A within 7 days. 3.Pt will complete all aspects of toileting with MAX A within 7 days. 4. Pt will complete 5 minutes of UE exercises against gravity within 7 days. 5. Pt will tolerate sitting EOB and completing basic grooming tasks with MIN A for sitting balance within 7 days. OCCUPATIONAL THERAPY EVALUATION  Patient: Keyshawn Hernadez (74 y.o. female)  Date: 3/3/2017  Primary Diagnosis: Generalized weakness        Precautions:   Fall      ASSESSMENT :  Based on the objective data described below, the patient presents with generalized weakness, pain, decreased activity tolerance limiting safety and independence with ADL routine. Pt agreeable to complete bed<>chair transfer today, states she is in pain and wishes to lay back down. She is MOD A to TOTAL A for lower body/standing ADLs, MAX A for functional transfers. She states she is fearful of falling. She may benefit from SNF level rehab once medically stable to maximize safety and independence with ADL routine. Patient will benefit from skilled intervention to address the above impairments.   Patients rehabilitation potential is considered to be Fair  Factors which may influence rehabilitation potential include:   [ ]             None noted  [ ]             Mental ability/status  [ ]             Medical condition  [ ]             Home/family situation and support systems  [X]             Safety awareness  [X]             Pain tolerance/management  [ ]             Other:        PLAN :  Recommendations and Planned Interventions:  [X]               Self Care Training                  [X]        Therapeutic Activities  [X]               Functional Mobility Training    [ ]        Cognitive Retraining  [X] Therapeutic Exercises           [X]        Endurance Activities  [X]               Balance Training                   [ ]        Neuromuscular Re-Education  [ ]               Visual/Perceptual Training     [X]   Home Safety Training  [X]               Patient Education                 [ ]        Family Training/Education  [ ]               Other (comment):     Frequency/Duration: Patient will be followed by occupational therapy 3 times a week to address goals. Discharge Recommendations: Skilled Nursing Facility  Further Equipment Recommendations for Discharge: TBD       SUBJECTIVE:   Patient stated I know I am at [facility].       OBJECTIVE DATA SUMMARY:   HISTORY:   No past medical history on file. No past surgical history on file. Prior Level of Function/Home Situation: per chart, sister was caregiver for pt. Sister is no longer able to care for pt. She laid in bed for three days before friends call EMS  Expanded or extensive additional review of patient history:      Home Situation  Home Environment: Private residence  # Steps to Enter: 1  One/Two Story Residence: One story  Living Alone: Yes (sister lives nearby)  Support Systems: Family member(s)  Patient Expects to be Discharged to[de-identified] Skilled nursing facility  Current DME Used/Available at Home: [employer]dem[employer] Dennison (in the community)        EXAMINATION OF PERFORMANCE DEFICITS:  Cognitive/Behavioral Status:  Neurologic State: Alert;Confused  Orientation Level: Oriented to person;Oriented to place  Cognition: Follows commands  Perception: Appears intact  Perseveration: No perseveration noted  Safety/Judgement: Awareness of environment; Fall prevention;Home safety  Skin: Observed skin intact  Edema: None observed  Range of Motion:     AROM: Generally decreased, functional (minimally functional )  PROM: Generally decreased, functional                    Strength:     Strength: Grossly decreased, non-functional              Coordination:  Coordination: Grossly decreased, non-functional  Fine Motor Skills-Upper: Left Intact; Right Intact    Gross Motor Skills-Upper: Left Intact; Right Intact  Tone & Sensation:     Tone: Normal                          Balance:  Sitting: Impaired; Without support  Sitting - Static: Fair (occasional)  Sitting - Dynamic: Fair (occasional)  Standing: Impaired; With support  Standing - Static: Poor  Standing - Dynamic : Poor     Functional Mobility and Transfers for ADLs:  Bed Mobility:  Rolling: Maximum assistance  Supine to Sit: Maximum assistance  Sit to Supine: Maximum assistance;Assist x1;Additional time  Scooting: Moderate assistance;Assist x1;Additional time     Transfers:  Sit to Stand: Maximum assistance;Assist x1;Additional time  Stand to Sit: Maximum assistance  Bed to Chair: Maximum assistance;Assist x1;Additional time     ADL Assessment:  Feeding: Supervision     Oral Facial Hygiene/Grooming: Supervision     Bathing: Moderate assistance     Upper Body Dressing: Minimum assistance     Lower Body Dressing: Total assistance     Toileting: Total assistance                 ADL Intervention and task modifications:     Cognitive Retraining  Safety/Judgement: Awareness of environment; Fall prevention;Home safety     Functional Measure:  Barthel Index:      Bathin  Bladder: 0  Bowels: 0  Groomin  Dressin  Feeding: 10  Mobility: 0  Stairs: 0  Toilet Use: 0  Transfer (Bed to Chair and Back): 10  Total: 30         Barthel and G-code impairment scale:  Percentage of impairment CH  0% CI  1-19% CJ  20-39% CK  40-59% CL  60-79% CM  80-99% CN  100%   Barthel Score 0-100 100 99-80 79-60 59-40 20-39 1-19    0   Barthel Score 0-20 20 17-19 13-16 9-12 5-8 1-4 0      The Barthel ADL Index: Guidelines  1. The index should be used as a record of what a patient does, not as a record of what a patient could do. 2. The main aim is to establish degree of independence from any help, physical or verbal, however minor and for whatever reason.   3. The need for supervision renders the patient not independent. 4. A patient's performance should be established using the best available evidence. Asking the patient, friends/relatives and nurses are the usual sources, but direct observation and common sense are also important. However direct testing is not needed. 5. Usually the patient's performance over the preceding 24-48 hours is important, but occasionally longer periods will be relevant. 6. Middle categories imply that the patient supplies over 50 per cent of the effort. 7. Use of aids to be independent is allowed. Kaykay Bright., Barthel, DShantelleW. (8301). Functional evaluation: the Barthel Index. [facility] (14)2. Vinay Santacruz dhara ANJELICA Marcus, Clifford Zhang., Zuleyma Gonzales., [place], [address] (1999). Measuring the change indisability after inpatient rehabilitation; comparison of the responsiveness of the Barthel Index and Functional [place] Measure. Journal of Neurology, Neurosurgery, and Psychiatry, 66(4), 928-489. Lizzeth Heck, N.J.A, HAMILTON Foster, & Parul Larose MShantelleA. (2004.) Assessment of post-stroke quality of life in cost-effectiveness studies: The usefulness of the Barthel Index and the EuroQoL-5D. Quality of Life Research, 13, 577-85            G codes: In compliance with CMSs Claims Based Outcome Reporting, the following G-code set was chosen for this patient based on their primary functional limitation being treated: The outcome measure chosen to determine the severity of the functional limitation was the Barthel Index with a score of 30/100 which was correlated with the impairment scale.       · Self Care:               - CURRENT STATUS:    CL - 60%-79% impaired, limited or restricted               - GOAL STATUS:           CK - 40%-59% impaired, limited or restricted               - D/C STATUS:                       ---------------To be determined---------------      Occupational Therapy Evaluation Charge Determination History Examination Decision-Making   LOW Complexity : Brief history review  MEDIUM Complexity : 3-5 performance deficits relating to physical, cognitive , or psychosocial skils that result in activity limitations and / or participation restrictions HIGH Complexity : Patient presents with comorbidities that affect occupational performance. Signifigant modification of tasks or assistance (eg, physical or verbal) with assessment (s) is necessary to enable patient to complete evaluation       Based on the above components, the patient evaluation is determined to be of the following complexity level: LOW   Pain:  Pain Scale 1: Numeric (0 - 10)  Pain Intensity 1: 0              Activity Tolerance:   Pt tolerated all activity without s/s of distress. Please refer to the flowsheet for vital signs taken during this treatment. After treatment:   [ ] Patient left in no apparent distress sitting up in chair  [X] Patient left in no apparent distress in bed  [X] Call bell left within reach  [X] Nursing notified  [X] Caregiver present  [ ] Bed alarm activated      COMMUNICATION/EDUCATION:   The patients plan of care was discussed with: Physical Therapist and Registered Nurse.  [X] Home safety education was provided and the patient/caregiver indicated understanding. [X] Patient/family have participated as able in goal setting and plan of care. [X] Patient/family agree to work toward stated goals and plan of care. [ ] Patient understands intent and goals of therapy, but is neutral about his/her participation. [ ] Patient is unable to participate in goal setting and plan of care. This patients plan of care is appropriate for delegation to [facility].      Thank you for this referral.  Luis Miguel Thurman OTR/L     Time Calculation: 17 mins

## 2017-03-03 NOTE — PROGRESS NOTES
Palliative Medicine Social Work     Progress Note:  Pt up in chair with lunch in front of her, telling the MD she's eaten all she wanted. When MD left, I ask pt if I can sit with her, but she declines, saying, \"Everytime someone sits with me, I get worse. \"  I let pt know I can come back another day and ask if I can call her sister, Efra Carrion, but pt doesn't answer. Following this encounter, I call pt's sister, leaving messages at 3 numbers  to give us a call:  [phone] (h); [phone] and . ED RN noted pt's sister also admitted to the hospital.      Resuscitation Status: Full Code   Durable DNR addressed? [] Yes   [x] No    [] Not Applicable    Advance Care Planning 3/1/2017   Patient's Healthcare Decision Maker is: Legal Next of Geetha 69   Primary Decision Maker Name Claybon Buerger   Primary Decision Maker Phone Number [phone]   Primary Decision Maker Relationship to Patient Sibling   Secondary Decision Maker Name self   Secondary Decision Maker Phone Number [phone]   Secondary Decision Maker Relationship to Patient Unknown   Confirm Advance Directive None   Patient Would Like to Complete Advance Directive No   Does the patient have other document types Other (comment)       Goals/Plans: Continue working to establish rapport with pt and working to find pt's Next of Kin. Thank you for the opportunity to be involved in the care of Nathan Chapman.     Romel Earl, MSSW, LSW, CCM I    Respecting Choices ® ACP Facilitator   Palliative Medicine I [facility]  [phone]

## 2017-03-03 NOTE — PROGRESS NOTES
Bedside shift change report given to [address] [place] [address] 107 (oncoming nurse) by Joaquim Colivn RN (offgoing nurse). Report included the following information SBAR and Kardex.

## 2017-03-03 NOTE — PROGRESS NOTES
Chart Reviewed:  CM met with patient at bedside. Patient was alert and oriented to person, place, and self. Patient had great difficulty staying on task when asked questions and became more confused as conversation progressed. Patient did state to CM that she did not feel safe going home and feels that she should not be left alone. Patient's next of kin/emergency contact is currently inpatient at [facility]. Patient's sister is Torres Friedman. Identified family support for patient is Carlita Salazar ( at [facility] [phone]) and Fatimah Guzman [phone] (H)/ [phone] ([phone]) (Close family friend and Confucianism member). Patient was a poor historian. CM met with patient's sister/CG Chaz Basilkeren. Chaz Torres has given CM verbal permission to speak with Carlita Salazar to coordinate services for her sister and assist her with discharge plans as well as patient's Leanor Sick (Dustin 109 who is Marielle's daughter. CG is interested in SNF/LTC placement for patient. Patient at this time is not eligible for Medicaid/LTC Medicaid and would be private pay for facilities. Identified facilities are [address] [place] [address]. CM submitted referrals to identified facilities for review and will follow-up with patient's sister Chaz Torres and Carlita Salazar to provide update.     NICKY Henao/YINKA  4:01 PM

## 2017-03-03 NOTE — PROGRESS NOTES
Occupational Therapy    Chart reviewed, cleared by RN to attempt to see pt. Pt received in bed with caregiver present. Offered OOB activity. Pt declining all offers - states she is in too much pain and that she does not know this therapist. Veverly Sharps fear of falling. Not wishing to participate with therapy at this time. Will follow up as able and appropriate.  Romana Cuadra, OTR/L

## 2017-03-04 PROCEDURE — 74011250636 HC RX REV CODE- 250/636: Performed by: HOSPITALIST

## 2017-03-04 PROCEDURE — 65270000032 HC RM SEMIPRIVATE

## 2017-03-04 PROCEDURE — 74011000258 HC RX REV CODE- 258: Performed by: INTERNAL MEDICINE

## 2017-03-04 PROCEDURE — 74011250637 HC RX REV CODE- 250/637: Performed by: INTERNAL MEDICINE

## 2017-03-04 PROCEDURE — 74011250636 HC RX REV CODE- 250/636: Performed by: INTERNAL MEDICINE

## 2017-03-04 RX ADMIN — Medication 10 ML: at 15:35

## 2017-03-04 RX ADMIN — SODIUM CHLORIDE 75 ML/HR: 900 INJECTION, SOLUTION INTRAVENOUS at 07:31

## 2017-03-04 RX ADMIN — LATANOPROST 1 DROP: 50 SOLUTION OPHTHALMIC at 22:50

## 2017-03-04 RX ADMIN — Medication 10 ML: at 07:32

## 2017-03-04 RX ADMIN — Medication 10 ML: at 22:50

## 2017-03-04 RX ADMIN — CEFTRIAXONE 1 G: 1 INJECTION, POWDER, FOR SOLUTION INTRAMUSCULAR; INTRAVENOUS at 15:33

## 2017-03-04 NOTE — PROGRESS NOTES
Bedside shift change report given to Max Cordon (oncoming nurse) by Jeannette Berkowitz (offgoing nurse). Report included the following information SBAR.     0440 Pt is refusing lab work, pt is screaming, yelling and being resistant.

## 2017-03-04 NOTE — PROGRESS NOTES
Hospitalist Progress Note  Office: [phone]      Date of Service:  3/4/2017  NAME:  Taisha Clemente  :  1929  MRN:  [id number]      Admission Summary:   79 yo woman with dementia, hypercalcemia, and HTN was BIBEMS from home on 3/1/17 with FTT, not eating or taking meds for the last 2-3 days since her sister/caregiver was admitted to [facility]. Interval history / Subjective:   Confused, paranoid; refusing meds, vitals check, nursing exam, and lab draw this morning     Assessment & Plan:     Failure to thrive/generalized weakness (POA)  - likely due to decreased po intake due to sister/caregiver's admission to hospital  - continue IVF  - PT/OT evals  - vitamin D, B12 WNL  - may need Nutrition consult for supplements  - UA on admission clear  - UA on 3/3 positive and UCx with GNR  - check prealbumin    GNR UTI (not POA)  - UCx 3/3 GNR >100K  - start empiric ceftriaxone 3/4    Subclinical hypothyroidism  - low TSH but normal free T4  - recheck in 4-6 weeks    Hypertension, uncontrolled (POA)  - likely due to not taking meds  - increased losartan 100mg po daily with hold parameters  - monitor while on IVF    Hypercalcemia (POA)  - possibly due to dehydration although cannot r/o occult malignancy  - resolved with IVF  - continue calcitriol for now    Hyponatremia (POA)  - likely due to decreased po fluid intake  - TSH  - cortisol WNL  - improving with IVF    Code status: Full  DVT prophylaxis: enoxaparin    Care Plan discussed with: Patient/Family, Nurse and   Disposition: TBD. Came from home.  Discharge to [address] [place] [address] on Monday 3/6 if medically stable     Hospital Problems  Never Reviewed          Codes Class Noted POA    Debility ICD-10-CM: R53.81  ICD-9-CM: 799.3  3/2/2017 Unknown        Paranoia ([facility] [address] ICD-10-CM: F22  ICD-9-CM: 297.1  3/2/2017 Unknown        Anxiety ICD-10-CM: F41.9  ICD-9-CM: 300.00  3/2/2017 Unknown Advanced care planning/counseling discussion ICD-10-CM: Z71.89  ICD-9-CM: V65.49  3/2/2017 Unknown        Generalized weakness ICD-10-CM: R53.1  ICD-9-CM: 780.79  3/1/2017 Unknown            Review of Systems:   Pertinent items are noted in HPI. Vital Signs:    Last 24hrs VS reviewed since prior progress note. Most recent are:  Visit Vitals    /72    Pulse 96    Temp 99.1 °F (37.3 °C)    Resp 18    Ht 5' (1.524 m)    Wt 47.6 kg (105 lb)    SpO2 100%    Breastfeeding No    BMI 20.51 kg/m2     No intake or output data in the 24 hours ending 03/04/17 1227     Physical Examination:     Constitutional:  awake, no acute distress, somewhat cooperative, confused, rambling speech    ENT:  oral mucosa dry, oropharynx benign; neck supple   Resp:  CTA bilaterally, no wheezing/rhonchi/rales   CV:  regular rhythm, normal rate, no m/r/g appreciated, no edema, +pulses    GI:  +BS, soft, non distended, non tender     Musculoskeletal:  moves all extremities    Neurologic:   AAOx3, NFD     Skin:  warm, dry  Eyes:  PERRL    Data Review:    Review and/or order of clinical lab test  Review and/or order of tests in the radiology section of CPT  Review and/or order of tests in the medicine section of CPT    Labs:     Recent Labs      03/03/17 0336 03/01/17   1323   WBC  13.6*  10.4   HGB  11.2*  12.7   HCT  31.9*  36.7   PLT  203  247     Recent Labs      03/03/17 0336 03/02/17 0149 03/01/17   1323   NA  131*  133*  131*   K  3.6  3.9  4.1   CL  96*  97  93*   CO2  26  26  25   BUN  15  16  20   CREA  1.20*  1.16*  1.37*   GLU  108*  132*  82   CA  8.8  9.4  10.7*   MG  1.2*   --    --    PHOS  1.6*   --    --      Recent Labs      03/03/17 0336 03/02/17 0149 03/01/17   1323   SGOT  19  22  28   ALT  25  26  33   AP  71  75  84   TBILI  0.7  0.8  0.8   TP  5.9*  6.6  8.2   ALB  2.5*  3.0*  3.8   GLOB  3.4  3.6  4.4*     No results for input(s): INR, PTP, APTT in the last 72 hours.     No lab exists for component: Marquez Mercury   Recent Labs      03/02/17   0149   FE  55   TIBC  174*   PSAT  32      Lab Results   Component Value Date/Time    Folate 14.4 03/02/2017 01:49 AM      No results for input(s): PH, PCO2, PO2 in the last 72 hours.   Recent Labs      03/01/17   1323   CPK  66     Lab Results   Component Value Date/Time    Cholesterol, total 133 10/19/2010 06:48 AM    HDL Cholesterol 50 10/19/2010 06:48 AM    LDL, calculated 69 10/19/2010 06:48 AM    Triglyceride 70 10/19/2010 06:48 AM    CHOL/HDL Ratio 2.7 10/19/2010 06:48 AM     Lab Results   Component Value Date/Time    Glucose (POC) 96 03/01/2017 02:22 PM    Glucose (POC) 74 03/01/2017 01:30 PM     Lab Results   Component Value Date/Time    Color YELLOW/STRAW 03/03/2017 10:27 AM    Appearance CLEAR 03/03/2017 10:27 AM    Specific gravity <1.005 03/03/2017 10:27 AM    pH (UA) 7.0 03/03/2017 10:27 AM    Protein NEGATIVE  03/03/2017 10:27 AM    Glucose NEGATIVE  03/03/2017 10:27 AM    Ketone NEGATIVE  03/03/2017 10:27 AM    Bilirubin NEGATIVE  03/03/2017 10:27 AM    Urobilinogen 0.2 03/03/2017 10:27 AM    Nitrites NEGATIVE  03/03/2017 10:27 AM    Leukocyte Esterase MODERATE 03/03/2017 10:27 AM    Epithelial cells FEW 03/03/2017 10:27 AM    Bacteria 1+ 03/03/2017 10:27 AM    WBC 5-10 03/03/2017 10:27 AM    RBC 0-5 03/03/2017 10:27 AM         Medications Reviewed:     Current Facility-Administered Medications   Medication Dose Route Frequency    cefTRIAXone (ROCEPHIN) 1 g in 0.9% sodium chloride (MBP/ADV) 50 mL  1 g IntraVENous Q24H    losartan (COZAAR) tablet 100 mg  100 mg Oral DAILY    0.9% sodium chloride infusion  75 mL/hr IntraVENous CONTINUOUS    calcitRIOL (ROCALTROL) capsule 0.25 mcg  0.25 mcg Oral Once per day on Sun Tue Wed Fri Sat    colchicine tablet 0.3 mg  0.3 mg Oral DAILY    latanoprost (XALATAN) 0.005 % ophthalmic solution 1 Drop  1 Drop Both Eyes QHS    sodium chloride (NS) flush 5-10 mL  5-10 mL IntraVENous Q8H    sodium chloride (NS) flush 5-10 mL  5-10 mL IntraVENous PRN    acetaminophen (TYLENOL) tablet 650 mg  650 mg Oral Q4H PRN    ondansetron (ZOFRAN) injection 4 mg  4 mg IntraVENous Q4H PRN    enoxaparin (LOVENOX) injection 30 mg  30 mg SubCUTAneous Q24H     ______________________________________________________________________  EXPECTED LENGTH OF STAY: 2d 16h  ACTUAL LENGTH OF STAY:          David Montes MD

## 2017-03-04 NOTE — PROGRESS NOTES
Bedside and Verbal shift change report given to Ariana Larsen (oncoming nurse) by Jabier Wallace (offgoing nurse). Report included the following information SBAR, Kardex, MAR and Recent Results. 1 - Dr Renée Ledezma asked to have patient Losartan scheduled for this evening at 1800. Patient refused this am dose. Sent message to pharmacy. 1 - Notified Dr Renée Ledezma that patient refuses all meds. Notified Dr Renée Ledezma of current BP at 164/86. MD did not place new orders at this time but to try and give the BP meds later.

## 2017-03-04 NOTE — PROGRESS NOTES
Spiritual Care Partner Volunteer visited patient in [address] on 3.3. 17.     Documented by: Michiel Lefort, Staff   Please call [phone] to page  if needed

## 2017-03-05 LAB
ANION GAP BLD CALC-SCNC: 7 MMOL/L (ref 5–15)
BACTERIA SPEC CULT: ABNORMAL
BASOPHILS # BLD AUTO: 0 K/UL (ref 0–0.1)
BASOPHILS # BLD: 0 % (ref 0–1)
BUN SERPL-MCNC: 19 MG/DL (ref 6–20)
BUN/CREAT SERPL: 17 (ref 12–20)
CALCIUM SERPL-MCNC: 9.6 MG/DL (ref 8.5–10.1)
CC UR VC: ABNORMAL
CHLORIDE SERPL-SCNC: 107 MMOL/L (ref 97–108)
CO2 SERPL-SCNC: 25 MMOL/L (ref 21–32)
CREAT SERPL-MCNC: 1.11 MG/DL (ref 0.55–1.02)
EOSINOPHIL # BLD: 0.1 K/UL (ref 0–0.4)
EOSINOPHIL NFR BLD: 0 % (ref 0–7)
ERYTHROCYTE [DISTWIDTH] IN BLOOD BY AUTOMATED COUNT: 14.5 % (ref 11.5–14.5)
EST. AVERAGE GLUCOSE BLD GHB EST-MCNC: 111 MG/DL
GLUCOSE SERPL-MCNC: 107 MG/DL (ref 65–100)
HBA1C MFR BLD: 5.5 % (ref 4.2–6.3)
HCT VFR BLD AUTO: 34.9 % (ref 35–47)
HGB BLD-MCNC: 11.9 G/DL (ref 11.5–16)
LYMPHOCYTES # BLD AUTO: 19 % (ref 12–49)
LYMPHOCYTES # BLD: 2.6 K/UL (ref 0.8–3.5)
MAGNESIUM SERPL-MCNC: 1.4 MG/DL (ref 1.6–2.4)
MCH RBC QN AUTO: 30.4 PG (ref 26–34)
MCHC RBC AUTO-ENTMCNC: 34.1 G/DL (ref 30–36.5)
MCV RBC AUTO: 89.3 FL (ref 80–99)
MONOCYTES # BLD: 0.6 K/UL (ref 0–1)
MONOCYTES NFR BLD AUTO: 4 % (ref 5–13)
NEUTS SEG # BLD: 10.9 K/UL (ref 1.8–8)
NEUTS SEG NFR BLD AUTO: 77 % (ref 32–75)
PHOSPHATE SERPL-MCNC: 2.5 MG/DL (ref 2.6–4.7)
PLATELET # BLD AUTO: 224 K/UL (ref 150–400)
POTASSIUM SERPL-SCNC: 4.5 MMOL/L (ref 3.5–5.1)
PREALB SERPL-MCNC: 6.2 MG/DL (ref 20–40)
RBC # BLD AUTO: 3.91 M/UL (ref 3.8–5.2)
SERVICE CMNT-IMP: ABNORMAL
SODIUM SERPL-SCNC: 139 MMOL/L (ref 136–145)
WBC # BLD AUTO: 14.2 K/UL (ref 3.6–11)

## 2017-03-05 PROCEDURE — 85025 COMPLETE CBC W/AUTO DIFF WBC: CPT | Performed by: INTERNAL MEDICINE

## 2017-03-05 PROCEDURE — 84100 ASSAY OF PHOSPHORUS: CPT | Performed by: INTERNAL MEDICINE

## 2017-03-05 PROCEDURE — 80048 BASIC METABOLIC PNL TOTAL CA: CPT | Performed by: INTERNAL MEDICINE

## 2017-03-05 PROCEDURE — 74011250637 HC RX REV CODE- 250/637: Performed by: INTERNAL MEDICINE

## 2017-03-05 PROCEDURE — 74011250636 HC RX REV CODE- 250/636: Performed by: INTERNAL MEDICINE

## 2017-03-05 PROCEDURE — 83735 ASSAY OF MAGNESIUM: CPT | Performed by: INTERNAL MEDICINE

## 2017-03-05 PROCEDURE — 74011250637 HC RX REV CODE- 250/637: Performed by: HOSPITALIST

## 2017-03-05 PROCEDURE — 83036 HEMOGLOBIN GLYCOSYLATED A1C: CPT | Performed by: INTERNAL MEDICINE

## 2017-03-05 PROCEDURE — 65270000032 HC RM SEMIPRIVATE

## 2017-03-05 PROCEDURE — 74011000258 HC RX REV CODE- 258: Performed by: INTERNAL MEDICINE

## 2017-03-05 PROCEDURE — 36415 COLL VENOUS BLD VENIPUNCTURE: CPT | Performed by: INTERNAL MEDICINE

## 2017-03-05 PROCEDURE — 84134 ASSAY OF PREALBUMIN: CPT | Performed by: INTERNAL MEDICINE

## 2017-03-05 RX ADMIN — Medication 10 ML: at 14:15

## 2017-03-05 RX ADMIN — LOSARTAN POTASSIUM 100 MG: 50 TABLET ORAL at 14:18

## 2017-03-05 RX ADMIN — Medication 10 ML: at 07:17

## 2017-03-05 RX ADMIN — CEFTRIAXONE 1 G: 1 INJECTION, POWDER, FOR SOLUTION INTRAMUSCULAR; INTRAVENOUS at 14:12

## 2017-03-05 RX ADMIN — CALCITRIOL 0.25 MCG: 0.25 CAPSULE, LIQUID FILLED ORAL at 14:18

## 2017-03-05 RX ADMIN — COLCHICINE 0.3 MG: 0.6 TABLET, FILM COATED ORAL at 14:17

## 2017-03-05 NOTE — PROGRESS NOTES
Bedside shift change report given to Alexx Grace (oncoming nurse) by Luz Elena Pierce (offgoing nurse). Report included the following information SBAR.     2115 Pt is refusing vitals at this time pt is being combative, will let pt calm down and re attempt vitals    0347 Pt is refusing to have fluids reconnected and pt is also refusing blood draws at this time. Will have another nurse to attempt.

## 2017-03-05 NOTE — PROGRESS NOTES
Hospitalist Progress Note  Office: [phone]      Date of Service:  3/5/2017  NAME:  Shawn Bhatti  :  1929  MRN:  [id number]      Admission Summary:   81 yo woman with dementia, hypercalcemia, and HTN was BIBEMS from home on 3/1/17 with FTT, not eating or taking meds for the last 2-3 days since her sister/caregiver was admitted to [facility].      Interval history / Subjective:   Confused, paranoid; refusing meds, vitals check, and nursing exam again this morning     Assessment & Plan:     Failure to thrive/generalized weakness (POA)  - likely due to decreased po intake due to sister/caregiver's admission to hospital  - continue IVF  - PT/OT evals  - vitamin D, B12 WNL  - UA 3/1 on admission clear  - UA 3/3 positive and UCx with GNR  - prealbumin very low  - Nutrition following; on supplements    Dementia with paranoid features   - may need Psychiatry consult once UTI and acute medical issues resolve  - often refusing to get vitals checked, receive meds, or have nurse examine    GNR UTI (not POA)  - UCx 3/3 GNR >100K  - started empiric ceftriaxone 3/4    Afebrile leucocytosis - WBC trending up, monitor    Subclinical hypothyroidism  - low TSH but normal free T4 and T3  - recheck in 4-6 weeks    Hypertension, uncontrolled (POA)  - likely due to not taking meds  - increased losartan 100mg po daily with hold parameters  - monitor while on IVF; stop IVF today    Hypercalcemia (POA)  - possibly due to dehydration although cannot r/o occult malignancy  - resolved with IVF  - continue calcitriol for now    Hyponatremia (POA)  - likely due to decreased po fluid intake  - TSH  - cortisol WNL  - resolved with IVF and free water restriction    Severe protein-calorie malnutrition  - prealbumin 6.2  - Nutrition following; continue supplements    Code status: Full  DVT prophylaxis: enoxaparin    Care Plan discussed with: Patient/Family, Nurse and Case Manager  Disposition: TBD. Came from home. Discharge to [address] [place] [address] on Monday 3/6 if medically stable     Hospital Problems  Never Reviewed          Codes Class Noted POA    Debility ICD-10-CM: R53.81  ICD-9-CM: 799.3  3/2/2017 Unknown        Paranoia ([address] ICD-10-CM: F22  ICD-9-CM: 297.1  3/2/2017 Unknown        Anxiety ICD-10-CM: F41.9  ICD-9-CM: 300.00  3/2/2017 Unknown        Advanced care planning/counseling discussion ICD-10-CM: Z71.89  ICD-9-CM: V65.49  3/2/2017 Unknown        Generalized weakness ICD-10-CM: R53.1  ICD-9-CM: 780.79  3/1/2017 Unknown            Review of Systems:   Pertinent items are noted in HPI. Vital Signs:    Last 24hrs VS reviewed since prior progress note.  Most recent are:  Visit Vitals    /72 (BP 1 Location: Left arm, BP Patient Position: At rest)    Pulse 69    Temp 98.5 °F (36.9 °C)    Resp 18    Ht 5' (1.524 m)    Wt 47.6 kg (105 lb)    SpO2 98%    Breastfeeding No    BMI 20.51 kg/m2       Intake/Output Summary (Last 24 hours) at 03/05/17 1034  Last data filed at 03/05/17 0716   Gross per 24 hour   Intake             1675 ml   Output                0 ml   Net             1675 ml        Physical Examination:     Constitutional:  awake, no acute distress, somewhat cooperative, confused, rambling speech, dementia with paranoia    ENT:  oral mucosa dry, oropharynx benign; neck supple   Resp:  CTA bilaterally, no wheezing/rhonchi/rales   CV:  regular rhythm, normal rate, no m/r/g appreciated, no edema, +pulses    GI:  +BS, soft, non distended, non tender     Musculoskeletal:  moves all extremities    Neurologic:   AAOx3, NFD     Skin:  warm, dry  Eyes:  PERRL    Data Review:    Review and/or order of clinical lab test  Review and/or order of tests in the radiology section of CPT  Review and/or order of tests in the medicine section of CPT    Labs:     Recent Labs      03/05/17   0404  03/03/17   0336   WBC  14.2*  13.6*   HGB  11.9  11.2*   HCT  34.9* 31.9*   PLT  224  203     Recent Labs      03/05/17   0404  03/03/17   0336   NA  139  131*   K  4.5  3.6   CL  107  96*   CO2  25  26   BUN  19  15   CREA  1.11*  1.20*   GLU  107*  108*   CA  9.6  8.8   MG  1.4*  1.2*   PHOS  2.5*  1.6*     Recent Labs      03/03/17   0336   SGOT  19   ALT  25   AP  71   TBILI  0.7   TP  5.9*   ALB  2.5*   GLOB  3.4     No results for input(s): INR, PTP, APTT in the last 72 hours. No lab exists for component: INREXT, INREXT   No results for input(s): FE, TIBC, PSAT, FERR in the last 72 hours. Lab Results   Component Value Date/Time    Folate 14.4 03/02/2017 01:49 AM      No results for input(s): PH, PCO2, PO2 in the last 72 hours. No results for input(s): CPK, CKNDX, TROIQ in the last 72 hours.     No lab exists for component: CPKMB  Lab Results   Component Value Date/Time    Cholesterol, total 133 10/19/2010 06:48 AM    HDL Cholesterol 50 10/19/2010 06:48 AM    LDL, calculated 69 10/19/2010 06:48 AM    Triglyceride 70 10/19/2010 06:48 AM    CHOL/HDL Ratio 2.7 10/19/2010 06:48 AM     Lab Results   Component Value Date/Time    Glucose (POC) 96 03/01/2017 02:22 PM    Glucose (POC) 74 03/01/2017 01:30 PM     Lab Results   Component Value Date/Time    Color YELLOW/STRAW 03/03/2017 10:27 AM    Appearance CLEAR 03/03/2017 10:27 AM    Specific gravity <1.005 03/03/2017 10:27 AM    pH (UA) 7.0 03/03/2017 10:27 AM    Protein NEGATIVE  03/03/2017 10:27 AM    Glucose NEGATIVE  03/03/2017 10:27 AM    Ketone NEGATIVE  03/03/2017 10:27 AM    Bilirubin NEGATIVE  03/03/2017 10:27 AM    Urobilinogen 0.2 03/03/2017 10:27 AM    Nitrites NEGATIVE  03/03/2017 10:27 AM    Leukocyte Esterase MODERATE 03/03/2017 10:27 AM    Epithelial cells FEW 03/03/2017 10:27 AM    Bacteria 1+ 03/03/2017 10:27 AM    WBC 5-10 03/03/2017 10:27 AM    RBC 0-5 03/03/2017 10:27 AM         Medications Reviewed:     Current Facility-Administered Medications   Medication Dose Route Frequency    cefTRIAXone (ROCEPHIN) 1 g in 0.9% sodium chloride (MBP/ADV) 50 mL  1 g IntraVENous Q24H    losartan (COZAAR) tablet 100 mg  100 mg Oral DAILY    0.9% sodium chloride infusion  75 mL/hr IntraVENous CONTINUOUS    calcitRIOL (ROCALTROL) capsule 0.25 mcg  0.25 mcg Oral Once per day on Sun Tue Wed Fri Sat    colchicine tablet 0.3 mg  0.3 mg Oral DAILY    latanoprost (XALATAN) 0.005 % ophthalmic solution 1 Drop  1 Drop Both Eyes QHS    sodium chloride (NS) flush 5-10 mL  5-10 mL IntraVENous Q8H    sodium chloride (NS) flush 5-10 mL  5-10 mL IntraVENous PRN    acetaminophen (TYLENOL) tablet 650 mg  650 mg Oral Q4H PRN    ondansetron (ZOFRAN) injection 4 mg  4 mg IntraVENous Q4H PRN    enoxaparin (LOVENOX) injection 30 mg  30 mg SubCUTAneous Q24H     ______________________________________________________________________  EXPECTED LENGTH OF STAY: 2d 16h  ACTUAL LENGTH OF STAY:          [address] Elmer Taylor MD

## 2017-03-06 VITALS
BODY MASS INDEX: 20.62 KG/M2 | HEART RATE: 82 BPM | RESPIRATION RATE: 17 BRPM | HEIGHT: 60 IN | TEMPERATURE: 98.2 F | OXYGEN SATURATION: 100 % | SYSTOLIC BLOOD PRESSURE: 174 MMHG | WEIGHT: 105 LBS | DIASTOLIC BLOOD PRESSURE: 82 MMHG

## 2017-03-06 PROBLEM — R53.1 GENERALIZED WEAKNESS: Status: RESOLVED | Noted: 2017-03-01 | Resolved: 2017-03-06

## 2017-03-06 LAB
GLUCOSE BLD STRIP.AUTO-MCNC: 122 MG/DL (ref 65–100)
SERVICE CMNT-IMP: ABNORMAL

## 2017-03-06 PROCEDURE — 74011250637 HC RX REV CODE- 250/637: Performed by: INTERNAL MEDICINE

## 2017-03-06 PROCEDURE — 82962 GLUCOSE BLOOD TEST: CPT

## 2017-03-06 PROCEDURE — 74011000258 HC RX REV CODE- 258: Performed by: INTERNAL MEDICINE

## 2017-03-06 PROCEDURE — 74011250636 HC RX REV CODE- 250/636: Performed by: INTERNAL MEDICINE

## 2017-03-06 RX ORDER — CEFUROXIME AXETIL 250 MG/1
250 TABLET ORAL 2 TIMES DAILY
Qty: 4 TAB | Refills: 0 | Status: SHIPPED | OUTPATIENT
Start: 2017-03-06

## 2017-03-06 RX ADMIN — LOSARTAN POTASSIUM 100 MG: 50 TABLET ORAL at 16:22

## 2017-03-06 RX ADMIN — CEFTRIAXONE 1 G: 1 INJECTION, POWDER, FOR SOLUTION INTRAMUSCULAR; INTRAVENOUS at 13:48

## 2017-03-06 RX ADMIN — Medication 10 ML: at 13:49

## 2017-03-06 NOTE — DISCHARGE INSTRUCTIONS
Discharge Instructions       PATIENT ID: Purnima Saldivar  MRN: [id number]   YOB: 1929    DATE OF ADMISSION: 3/1/2017 12:53 PM    DATE OF DISCHARGE: 3/6/2017    PRIMARY CARE PROVIDER: Giuliana Plata MD     ATTENDING PHYSICIAN: Edyta Falcon MD  DISCHARGING PROVIDER: Edyta Falcon MD    To contact this individual call [phone] and ask the  to page. If unavailable ask to be transferred the Adult Hospitalist Department. DISCHARGE DIAGNOSES see dc note    CONSULTATIONS: ED CONSULT TO SENIOR SERVICES CASE MANAGEMENT  IP CONSULT TO PALLIATIVE CARE - PROVIDER  IP CONSULT TO HOSPITALIST    PROCEDURES/SURGERIES: * No surgery found *    PENDING TEST RESULTS:   At the time of discharge the following test results are still pending: na    FOLLOW UP APPOINTMENTS:   Follow-up Information     Follow up With Details Comments Felix Ballesteros MD In 2 weeks UTI, in hospital, f/u UA, other lab as felt needed. [address] [facility]  [address] [facility]  [phone]             ADDITIONAL CARE RECOMMENDATIONS: na    DIET: regular low lactose diet   Oral Nutritional Supplements: Ensure Complete or Ensure Plus Three times daily     ACTIVITY: Activity as tolerated    WOUND CARE: na     EQUIPMENT needed: na      DISCHARGE MEDICATIONS:   See Medication Reconciliation Form    · It is important that you take the medication exactly as they are prescribed. · Keep your medication in the bottles provided by the pharmacist and keep a list of the medication names, dosages, and times to be taken in your wallet. · Do not take other medications without consulting your doctor. NOTIFY YOUR PHYSICIAN FOR ANY OF THE FOLLOWING:   Fever over 101 degrees for 24 hours. Chest pain, shortness of breath, fever, chills, nausea, vomiting, diarrhea, change in mentation, falling, weakness, bleeding. Severe pain or pain not relieved by medications.   Or, any other signs or symptoms that you may have questions about.       DISPOSITION:    Home With:   OT  PT  HH  RN      x SNF/Inpatient Rehab/LTAC    Independent/assisted living    Hospice    Other:     CDMP Checked:        Signed:   Daniel Claudio MD  3/6/2017  2:59 PM

## 2017-03-06 NOTE — PROGRESS NOTES
Bedside shift change report given to Mitzi Robertson (oncoming nurse) by Diaz Yoo (offgoing nurse). Report included the following information SBAR.     2102 Pt is refusing eye drops and vitals at this time. Pt stating I am trying to hurt her \"Shannon you are being sneaky, just trying to hurt me\" will try again once pt is calm. 12 Pt is combative and refusing nursing care, and vitals. 4983 Pt refusing lab work. Combative, yelling and screaming when attempted.

## 2017-03-06 NOTE — PROGRESS NOTES
Physical Therapy  Attempted to work with patient but she is adamantly refusing for me to work with her. Friend of family is present and spoke with me in the ornelas and states patient is like this most of the time. She also states that patient takes care of herself for the most part up until the last month. She then asked for a cup of hot tea which I fixed for patient and when I brought it in the room the patient stated to take it away. That she didn't want anything from me. Unable to work with patient as she appears very paranoid and will not allow treatment. Will follow up tomorrow.   Radha Serna, PT

## 2017-03-06 NOTE — PROGRESS NOTES
Bedside shift change report given to [facility] SLICK (oncoming nurse) by Mary Quezada (offgoing nurse). Report included the following information SBAR.     7881- Dr. Rubén Mitchell aware of patients refusal for medications. Will continue to monitor. Suggested a psych consult during the [address] meeting, informed it would not be ordered. 56- Dr. Rubén Mitchell informed that bed is available for patient at [facility] at 1600, transport to provide transportation. 1453- Attempted to call report at [facility]- 2x attempt to call report at [facility]. 1517- Spoke to CHRISTIE Contreras who received report. Patient unable to sign AVS r/t confusion and dementia. [facility] listed on emergency contact to inform about discharge. 174/82 patient given scheduled BP medication that was refused earlier.

## 2017-03-06 NOTE — DISCHARGE SUMMARY
Discharge Summary       PATIENT ID: Shawn Bhatti  MRN: [id number]   YOB: 1929    DATE OF ADMISSION: 3/1/2017 12:53 PM    DATE OF DISCHARGE: 3/6/2017    PRIMARY CARE PROVIDER: Guanako Bermudez MD     ATTENDING PHYSICIAN: Dagmar Cook MD   DISCHARGING PROVIDER: Dagmar Cook MD    To contact this individual call [phone] and ask the  to page. If unavailable ask to be transferred the Adult Hospitalist Department. CONSULTATIONS: ED CONSULT TO SENIOR SERVICES CASE MANAGEMENT  IP CONSULT TO PALLIATIVE CARE - PROVIDER  IP CONSULT TO HOSPITALIST    PROCEDURES/SURGERIES: * No surgery found *    ADMITTING DIAGNOSES & HOSPITAL COURSE:     Recent PN:        Admission Summary:   81 yo woman with dementia, hypercalcemia, and HTN was BIBEMS from home on 3/1/17 with FTT, not eating or taking meds for the last 2-3 days since her sister/caregiver was admitted to [facility].       Interval history / Subjective:   Confused, paranoid; refusing meds, vitals check, and nursing exam again this morning      Assessment & Plan:      Failure to thrive/generalized weakness (POA)  - likely due to decreased po intake due to sister/caregiver's admission to hospital  - continue IVF  - PT/OT evals  - vitamin D, B12 WNL  - UA 3/1 on admission clear  - UA 3/3 positive and UCx with GNR  - prealbumin very low  - Nutrition following; on supplements     Dementia with paranoid features   - may need Psychiatry consult once UTI and acute medical issues resolve  - often refusing to get vitals checked, receive meds, or have nurse examine     GNR UTI (not POA)  - UCx 3/3 GNR >100K  - started empiric ceftriaxone 3/4     Afebrile leucocytosis - WBC trending up, monitor     Subclinical hypothyroidism  - low TSH but normal free T4 and T3  - recheck in 4-6 weeks     Hypertension, uncontrolled (POA)  - likely due to not taking meds  - increased losartan 100mg po daily with hold parameters  - monitor while on IVF; stop IVF today     Hypercalcemia (POA)  - possibly due to dehydration although cannot r/o occult malignancy  - resolved with IVF  - continue calcitriol for now     Hyponatremia (POA)  - likely due to decreased po fluid intake  - TSH  - cortisol WNL  - resolved with IVF and free water restriction     Severe protein-calorie malnutrition  - prealbumin 6.2  - Nutrition following; continue supplements     Code status: Full  DVT prophylaxis: enoxaparin     Care Plan discussed with: Patient/Family, Nurse and   Disposition: TBD. Came from home. Discharge to [facility] on Monday 3/6 if medically stable      Hospital Problems  Never Reviewed             Codes Class Noted POA     Debility ICD-10-CM: R53.81  ICD-9-CM: 986. 3   3/2/2017 Unknown           Paranoia ([address] ICD-10-CM: F22  ICD-9-CM: 297.1   3/2/2017 Unknown           Anxiety ICD-10-CM: F41.9  ICD-9-CM: 300.00   3/2/2017 Unknown           Advanced care planning/counseling discussion ICD-10-CM: Z71.89  ICD-9-CM: V65.49   3/2/2017 Unknown           Generalized weakness ICD-10-CM: R53.1  ICD-9-CM: 780.79   3/1/2017 Unknown               Review of Systems:   Pertinent items are noted in HPI.      Vital Signs:   Last 24hrs VS reviewed since prior progress note.  Most recent are:       Visit Vitals    /72 (BP 1 Location: Left arm, BP Patient Position: At rest)    Pulse 69    Temp 98.5 °F (36.9 °C)    Resp 18    Ht 5' (1.524 m)    Wt 47.6 kg (105 lb)    SpO2 98%    Breastfeeding No    BMI 20.51 kg/m2         Intake/Output Summary (Last 24 hours) at 03/05/17 1034  Last data filed at 03/05/17 0716    Gross per 24 hour   Intake 1675 ml   Output 0 ml   Net 1675 ml         Physical Examination:      Constitutional: awake, no acute distress, somewhat cooperative, confused, rambling speech, dementia with paranoia    ENT: oral mucosa dry, oropharynx benign; neck supple   Resp: CTA bilaterally, no wheezing/rhonchi/rales   CV: regular rhythm, normal rate, no m/r/g appreciated, no edema, +pulses   GI: +BS, soft, non distended, non tender    Musculoskeletal: moves all extremities   Neurologic: AAOx3, NFD   Skin: warm, dry  Eyes: PERRL          DISCHARGE DIAGNOSES / PLAN:      1.  as above        PENDING TEST RESULTS:   At the time of discharge the following test results are still pending: na    FOLLOW UP APPOINTMENTS:    Follow-up Information     Follow up With Details Comments Contact Info    Girish Mcclure MD In 2 weeks UTI, in hospital, f/u UA, other lab as felt needed. [address] [facility]  [address]  [phone]             ADDITIONAL CARE RECOMMENDATIONS: na    DIET: regular low lactose  Oral Nutritional Supplements: Ensure Complete or Ensure PlusThree times daily    ACTIVITY: Activity as tolerated    WOUND CARE: na    EQUIPMENT needed: na      DISCHARGE MEDICATIONS:  Current Discharge Medication List      START taking these medications    Details   cefUROXime (CEFTIN) 250 mg tablet Take 1 Tab by mouth two (2) times a day. Qty: 4 Tab, Refills: 0         CONTINUE these medications which have NOT CHANGED    Details   calcitRIOL (ROCALTROL) 0.25 mcg capsule Take 0.25 mcg by mouth. Take 1 capsule by mouth every Tuesday, Wednesday, Friday, Saturday, Sunday      colchicine 0.6 mg tablet Take 0.6 mg by mouth daily. latanoprost (XALATAN) 0.005 % ophthalmic solution Administer 1 Drop to both eyes nightly. losartan (COZAAR) 50 mg tablet Take 50 mg by mouth daily. estradiol (YUVAFEM) 10 mcg tab vaginal tablet Insert 10 mcg into vagina nightly. NOTIFY YOUR PHYSICIAN FOR ANY OF THE FOLLOWING:   Fever over 101 degrees for 24 hours. Chest pain, shortness of breath, fever, chills, nausea, vomiting, diarrhea, change in mentation, falling, weakness, bleeding. Severe pain or pain not relieved by medications. Or, any other signs or symptoms that you may have questions about.     DISPOSITION:    Home With:   OT  PT  [facility] RN      x Long term SNF/Inpatient Rehab    Independent/assisted living    Hospice    Other:       PATIENT CONDITION AT DISCHARGE:     Functional status   x Poor     Deconditioned     Independent      Cognition     Lucid    x Forgetful     Dementia      Catheters/lines (plus indication)    Daniels     PICC     PEG     None      Code status   x  Full code     DNR      PHYSICAL EXAMINATION AT DISCHARGE:   Refer to Progress Note    Visit Vitals    /82 (BP 1 Location: Left arm, BP Patient Position: At rest)    Pulse 82    Temp 98.2 °F (36.8 °C)    Resp 17    Ht 5' (1.524 m)    Wt 47.6 kg (105 lb)    SpO2 100%    Breastfeeding No    BMI 20.51 kg/m2    see above       CHRONIC MEDICAL DIAGNOSES:  Problem List as of 3/6/2017  Date Reviewed: 3/6/2017          Codes Class Noted - Resolved    Debility ICD-10-CM: R53.81  ICD-9-CM: 799.3  3/2/2017 - Present        Paranoia ([facility] [address] ICD-10-CM: F22  ICD-9-CM: 297.1  3/2/2017 - Present        Anxiety ICD-10-CM: F41.9  ICD-9-CM: 300.00  3/2/2017 - Present        Advanced care planning/counseling discussion ICD-10-CM: Z71.89  ICD-9-CM: V65.49  3/2/2017 - Present        RESOLVED: Generalized weakness ICD-10-CM: R53.1  ICD-9-CM: 780.79  3/1/2017 - 3/6/2017              Greater than 30  minutes were spent with the patient on counseling and coordination of care    Signed:   Deon Cheng MD  3/6/2017  3:00 PM

## 2017-03-06 NOTE — PROGRESS NOTES
CM received notification that patient is medically stable for discharge. [facility] are willing to accept patient for services. Authorization submitted from facility to [facility]. Patient's CG, Krunal Murphy on board for patient to transfer to [facility] for skilled services. Patient's CG has been assisted by Katharine Company ( at [facility] [phone]) and Joao Garzon [phone])/ [phone] (M) (Close family friend and [employer] member) in making decisions. [facility] is able to take patient today. Discharge plans are for patient to go to [facility] and [address] [place] [address] for skilled services. CM submitted a referral to [facility] to transport patient to facility. CM received notification that they are willing to accept patient and would be able to transport patient at 4:30 pm.  Call Report to [phone] at [facility]. CM to fax AVS to [phone]. No other disposition needs at this time. CM to assist if disposition needs arise.     Omkar Arroyo MSSHARONA/YINKA

## 2017-03-06 NOTE — PROGRESS NOTES
Hospital to SNF SBAR Handoff - Taisha Clemente                                                                        80 y.o.   female    [facility]   Room: [address]    [facility]  Unit Phone# :  [phone]      ST.  [address] [address]  Dept: [address]: [phone]                    SITUATION     Admitted:  3/1/2017         Attending Provider:  Kristy Morales MD       Consultations:  ED CONSULT TO SENIOR SERVICES CASE MANAGEMENT  IP CONSULT TO [facility] - PROVIDER  IP CONSULT TO HOSPITALIST    PCP:  Jerrod Lara MD   [phone]    Treatment Team: Attending Provider: Kristy Morales MD; Primary Nurse: Chloe Carr RN; Consulting Provider: Cam Edwards MD; Consulting Provider: Kerrie Lane MD; Utilization Review: Ace Fountain RN; Care Manager: Kennard Severs; Utilization Review: Marlen Alonso RN    Admitting Dx:  Generalized weakness       Principal Problem: <principal problem not specified>    * No surgery found * of      BY: * Surgery not found *             ON: * No surgery found *                  Code Status: Full Code                Advance Directives:   Advance Care Planning 3/1/2017   Patient's Healthcare Decision Maker is: Legal Next of Geetha 69   Primary Decision Maker Name Boyd Blackman   Primary Decision Maker Phone Number [phone]   Primary Decision Maker Relationship to Patient Sibling   Secondary Decision Maker Name self   Secondary [address] Phone Number [phone]   Secondary Decision Maker Relationship to Patient Unknown   Confirm Advance Directive None   Patient Would Like to Complete Advance Directive No   Does the patient have other document types Other (comment)    (Send w/patient)   No Doesnt Have       Isolation:  There are currently no Active Isolations       MDRO: No current active infections    Pain Medications given:  N/A    Last dose: none    Special Equipment needed: no  Type of equipment:         BACKGROUND     Allergies: Allergies   Allergen Reactions    Penicillins Unknown (comments)     Hx dementia       No past medical history on file. No past surgical history on file. Prescriptions Prior to Admission   Medication Sig    calcitRIOL (ROCALTROL) 0.25 mcg capsule Take 0.25 mcg by mouth. Take 1 capsule by mouth every Tuesday, Wednesday, Friday, Saturday, Sunday    colchicine 0.6 mg tablet Take 0.6 mg by mouth daily.  latanoprost (XALATAN) 0.005 % ophthalmic solution Administer 1 Drop to both eyes nightly.  losartan (COZAAR) 50 mg tablet Take 50 mg by mouth daily.  estradiol (YUVAFEM) 10 mcg tab vaginal tablet Insert 10 mcg into vagina nightly. Hard scripts included in transfer packet no    Vaccinations: There is no immunization history on file for this patient. Readmission Risks:    Known Risks: Fall        The [employer] Index tool is an evidenced based tool that has more automatic generated information. The tool looks at many different items such as the age of the patient, how many times they were admitted in the last calendar year, current length of stay in the hospital and their diagnosis. All of these items are pulled automatically from information documented in the chart from various places and will generate a score that predicts whether a patient is at low (less than 13), medium (13-20) or high (21 or greater) risk of being readmitted.         ASSESSMENT                Temp: 98.2 °F (36.8 °C) (03/06/17 1438) Pulse (Heart Rate): 82 (03/06/17 1438)     Resp Rate: 17 (03/06/17 1438)           BP: 174/82 (03/06/17 1438)     O2 Sat (%): 100 % (03/06/17 1438)     Weight: 47.6 kg (105 lb)    Height: 5' (152.4 cm) (03/02/17 1029)       If above not within 1 hour of discharge:    BP:_____  P:____  R:____ T:_____ O2 Sat: ___%  O2: ______    Active Orders   Diet    DIET REGULAR Low Lactose         Orientation: only aware of  person and place Active Behaviors: Agitation                                   Active Lines/Drains:  (Peg Tube / Daniels / CL or S/L?): no    Urinary Status: Incontinent, Incontinent briefs     Last BM: Last Bowel Movement Date:  (unknown pt is confused AMS )     Skin Integrity: Intact             Mobility: Very limited   Weight Bearing Status: WBAT (Weight Bearing as Tolerated)                Lab Results   Component Value Date/Time    Glucose 107 03/05/2017 04:04 AM    Hemoglobin A1c 5.5 03/05/2017 04:04 AM    HGB 11.9 03/05/2017 04:04 AM    HGB 11.2 03/03/2017 03:36 AM        RECOMMENDATION     See After Visit Summary (AVS) for:  · Discharge instructions  · After [address]   · Special equipment needed (entered pre-discharge by Care Management)  · Medication Reconciliation    · Follow up Appointment(s)         Report given/sent by:  Eliseo Dukes                    Verbal report given to:  CHRISTIE Rodriguez  FAXED to:  [facility]          Estimated discharge time:  3/6/2017 at 1600